# Patient Record
Sex: FEMALE | Race: OTHER
[De-identification: names, ages, dates, MRNs, and addresses within clinical notes are randomized per-mention and may not be internally consistent; named-entity substitution may affect disease eponyms.]

---

## 2019-04-11 ENCOUNTER — HOSPITAL ENCOUNTER (INPATIENT)
Dept: HOSPITAL 72 - EMR | Age: 84
LOS: 5 days | Discharge: NURSING, REHAB OR LONG-TERM CARE | DRG: 720 | End: 2019-04-16
Payer: MEDICAID

## 2019-04-11 VITALS — DIASTOLIC BLOOD PRESSURE: 70 MMHG | SYSTOLIC BLOOD PRESSURE: 123 MMHG

## 2019-04-11 VITALS — DIASTOLIC BLOOD PRESSURE: 72 MMHG | SYSTOLIC BLOOD PRESSURE: 136 MMHG

## 2019-04-11 VITALS — HEIGHT: 60 IN | WEIGHT: 172.44 LBS | BODY MASS INDEX: 33.86 KG/M2

## 2019-04-11 DIAGNOSIS — I69.354: ICD-10-CM

## 2019-04-11 DIAGNOSIS — E87.5: ICD-10-CM

## 2019-04-11 DIAGNOSIS — Z95.828: ICD-10-CM

## 2019-04-11 DIAGNOSIS — N18.9: ICD-10-CM

## 2019-04-11 DIAGNOSIS — R13.10: ICD-10-CM

## 2019-04-11 DIAGNOSIS — D69.6: ICD-10-CM

## 2019-04-11 DIAGNOSIS — E87.8: ICD-10-CM

## 2019-04-11 DIAGNOSIS — E11.22: ICD-10-CM

## 2019-04-11 DIAGNOSIS — Z43.1: ICD-10-CM

## 2019-04-11 DIAGNOSIS — E87.6: ICD-10-CM

## 2019-04-11 DIAGNOSIS — I25.10: ICD-10-CM

## 2019-04-11 DIAGNOSIS — I51.3: ICD-10-CM

## 2019-04-11 DIAGNOSIS — J18.9: ICD-10-CM

## 2019-04-11 DIAGNOSIS — E11.65: ICD-10-CM

## 2019-04-11 DIAGNOSIS — E86.0: ICD-10-CM

## 2019-04-11 DIAGNOSIS — E66.9: ICD-10-CM

## 2019-04-11 DIAGNOSIS — N39.0: ICD-10-CM

## 2019-04-11 DIAGNOSIS — A41.9: Primary | ICD-10-CM

## 2019-04-11 DIAGNOSIS — N17.9: ICD-10-CM

## 2019-04-11 DIAGNOSIS — B18.2: ICD-10-CM

## 2019-04-11 DIAGNOSIS — F02.80: ICD-10-CM

## 2019-04-11 DIAGNOSIS — I82.511: ICD-10-CM

## 2019-04-11 DIAGNOSIS — I13.10: ICD-10-CM

## 2019-04-11 DIAGNOSIS — E44.0: ICD-10-CM

## 2019-04-11 DIAGNOSIS — G93.40: ICD-10-CM

## 2019-04-11 DIAGNOSIS — E87.0: ICD-10-CM

## 2019-04-11 DIAGNOSIS — G30.9: ICD-10-CM

## 2019-04-11 LAB
ADD MANUAL DIFF: YES
ALBUMIN SERPL-MCNC: 2.9 G/DL (ref 3.4–5)
ALBUMIN/GLOB SERPL: 0.5 {RATIO} (ref 1–2.7)
ALP SERPL-CCNC: 74 U/L (ref 46–116)
ALT SERPL-CCNC: 51 U/L (ref 12–78)
ANION GAP SERPL CALC-SCNC: 12 MMOL/L (ref 5–15)
APPEARANCE UR: CLEAR
APTT PPP: YELLOW S
AST SERPL-CCNC: 42 U/L (ref 15–37)
BILIRUB SERPL-MCNC: 0.7 MG/DL (ref 0.2–1)
BUN SERPL-MCNC: 45 MG/DL (ref 7–18)
CALCIUM SERPL-MCNC: 9.2 MG/DL (ref 8.5–10.1)
CHLORIDE SERPL-SCNC: 120 MMOL/L (ref 98–107)
CK MB SERPL-MCNC: 0.6 NG/ML (ref 0–3.6)
CK SERPL-CCNC: 44 U/L (ref 26–308)
CO2 SERPL-SCNC: 28 MMOL/L (ref 21–32)
CREAT SERPL-MCNC: 1.1 MG/DL (ref 0.55–1.3)
ERYTHROCYTE [DISTWIDTH] IN BLOOD BY AUTOMATED COUNT: 18.2 % (ref 11.6–14.8)
GLOBULIN SER-MCNC: 5.3 G/DL
GLUCOSE UR STRIP-MCNC: NEGATIVE MG/DL
HCT VFR BLD CALC: 43.2 % (ref 37–47)
HGB BLD-MCNC: 13.4 G/DL (ref 12–16)
KETONES UR QL STRIP: (no result)
LEUKOCYTE ESTERASE UR QL STRIP: (no result)
MCV RBC AUTO: 91 FL (ref 80–99)
NITRITE UR QL STRIP: NEGATIVE
PH UR STRIP: 5 [PH] (ref 4.5–8)
PHOSPHATE SERPL-MCNC: 2.9 MG/DL (ref 2.5–4.9)
PLATELET # BLD: 118 K/UL (ref 150–450)
POTASSIUM SERPL-SCNC: 3.3 MMOL/L (ref 3.5–5.1)
PROT UR QL STRIP: (no result)
RBC # BLD AUTO: 4.77 M/UL (ref 4.2–5.4)
SODIUM SERPL-SCNC: 161 MMOL/L (ref 136–145)
SP GR UR STRIP: 1.02 (ref 1–1.03)
UROBILINOGEN UR-MCNC: 4 MG/DL (ref 0–1)
WBC # BLD AUTO: 22.5 K/UL (ref 4.8–10.8)

## 2019-04-11 PROCEDURE — 85007 BL SMEAR W/DIFF WBC COUNT: CPT

## 2019-04-11 PROCEDURE — 84484 ASSAY OF TROPONIN QUANT: CPT

## 2019-04-11 PROCEDURE — 87340 HEPATITIS B SURFACE AG IA: CPT

## 2019-04-11 PROCEDURE — 83690 ASSAY OF LIPASE: CPT

## 2019-04-11 PROCEDURE — 71045 X-RAY EXAM CHEST 1 VIEW: CPT

## 2019-04-11 PROCEDURE — 82803 BLOOD GASES ANY COMBINATION: CPT

## 2019-04-11 PROCEDURE — 93971 EXTREMITY STUDY: CPT

## 2019-04-11 PROCEDURE — 85730 THROMBOPLASTIN TIME PARTIAL: CPT

## 2019-04-11 PROCEDURE — 85025 COMPLETE CBC W/AUTO DIFF WBC: CPT

## 2019-04-11 PROCEDURE — 36600 WITHDRAWAL OF ARTERIAL BLOOD: CPT

## 2019-04-11 PROCEDURE — 76700 US EXAM ABDOM COMPLETE: CPT

## 2019-04-11 PROCEDURE — 96375 TX/PRO/DX INJ NEW DRUG ADDON: CPT

## 2019-04-11 PROCEDURE — 96367 TX/PROPH/DG ADDL SEQ IV INF: CPT

## 2019-04-11 PROCEDURE — 80048 BASIC METABOLIC PNL TOTAL CA: CPT

## 2019-04-11 PROCEDURE — 84100 ASSAY OF PHOSPHORUS: CPT

## 2019-04-11 PROCEDURE — 96365 THER/PROPH/DIAG IV INF INIT: CPT

## 2019-04-11 PROCEDURE — 83880 ASSAY OF NATRIURETIC PEPTIDE: CPT

## 2019-04-11 PROCEDURE — 83605 ASSAY OF LACTIC ACID: CPT

## 2019-04-11 PROCEDURE — 82550 ASSAY OF CK (CPK): CPT

## 2019-04-11 PROCEDURE — 83735 ASSAY OF MAGNESIUM: CPT

## 2019-04-11 PROCEDURE — 81003 URINALYSIS AUTO W/O SCOPE: CPT

## 2019-04-11 PROCEDURE — 86709 HEPATITIS A IGM ANTIBODY: CPT

## 2019-04-11 PROCEDURE — 80053 COMPREHEN METABOLIC PANEL: CPT

## 2019-04-11 PROCEDURE — 96368 THER/DIAG CONCURRENT INF: CPT

## 2019-04-11 PROCEDURE — 36415 COLL VENOUS BLD VENIPUNCTURE: CPT

## 2019-04-11 PROCEDURE — 99285 EMERGENCY DEPT VISIT HI MDM: CPT

## 2019-04-11 PROCEDURE — 87040 BLOOD CULTURE FOR BACTERIA: CPT

## 2019-04-11 PROCEDURE — 86710 INFLUENZA VIRUS ANTIBODY: CPT

## 2019-04-11 PROCEDURE — 86803 HEPATITIS C AB TEST: CPT

## 2019-04-11 PROCEDURE — 87081 CULTURE SCREEN ONLY: CPT

## 2019-04-11 PROCEDURE — 82553 CREATINE MB FRACTION: CPT

## 2019-04-11 PROCEDURE — 87086 URINE CULTURE/COLONY COUNT: CPT

## 2019-04-11 PROCEDURE — 86705 HEP B CORE ANTIBODY IGM: CPT

## 2019-04-11 PROCEDURE — 93005 ELECTROCARDIOGRAM TRACING: CPT

## 2019-04-11 PROCEDURE — 96361 HYDRATE IV INFUSION ADD-ON: CPT

## 2019-04-11 PROCEDURE — 86703 HIV-1/HIV-2 1 RESULT ANTBDY: CPT

## 2019-04-11 RX ADMIN — DEXTROSE AND POTASSIUM CHLORIDE SCH MLS/HR: 5; .15 SOLUTION INTRAVENOUS at 18:57

## 2019-04-11 RX ADMIN — DONEPEZIL HYDROCHLORIDE SCH MG: 10 TABLET, FILM COATED ORAL at 22:06

## 2019-04-11 RX ADMIN — ATENOLOL SCH MG: 25 TABLET ORAL at 19:00

## 2019-04-11 NOTE — DIAGNOSTIC IMAGING REPORT
Indication: Shortness of breath

 

Technique: One view of the chest

 

Comparison: 3/25/2019

 

Findings: Inspiration is suboptimal with crowding of the bronchovascular markings.

Lungs and pleural spaces are otherwise clear. The heart size is normal. The aorta is

tortuous and calcified.

 

Impression: No acute process

## 2019-04-11 NOTE — NUR
NURSE NOTES:

Pt's aPTT @ 138, per protocol. Heparin to be stopped for 1 hour and continued at 2345 @ a 
rate of 15 u/kg/hr. Next aPTT set up for 0545. Will continue to monitor

## 2019-04-11 NOTE — DIAGNOSTIC IMAGING REPORT
Indication: Right leg pain

 

Technique: Grayscale and duplex images of the right lower extremity veins

 

Comparison: 3/13/2019

 

Findings: Intraluminal thrombus is seen in the common femoral vein, with resultant

decrease Doppler flow and noncompressibility. This is equivocally more extensive than

on the prior study. Intraluminal thrombus is also seen within the right femoral and

popliteal veins. This results in noncompressibility and absent Doppler signal as

well. The calf veins appear to be patent.

 

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 This is also described

on recent study of 3/13/2019, and it is unclear whether current findings represent

residual thrombosis or whether there is increased thrombus relative to the prior

study

 

Findings discussed by phone with Dr. Macias in the emergency room at the time of

interpretation

## 2019-04-11 NOTE — NUR
NURSE NOTES:

pt on 3lnc. rhonchi through out with weak cough. abdomen large, non tender. temp oral 996.4 
bp 142/62 hr 89, rr 22 o2sat 96%. no c/o pain per daughter at bedside. pt in adult diaper, 
perianal and perineal redness. purewick applied. 1 bm, brown large. rt leg, edema and hot to 
touch. heparin drip  at 18u/kg/hr. fall precautions in place. bed alarm on. will continue to 
monitor pt.

## 2019-04-11 NOTE — NUR
ED Nurse Note:pt. 

s skin is dry and intact with perianal redness, right leg is inlarged and 
edemous, ND is aware

## 2019-04-11 NOTE — HISTORY AND PHYSICAL REPORT
DATE OF ADMISSION:  04/11/2019

PURPOSE FOR ADMISSION:

1. Sepsis.

2. Acute encephalopathy.

3. Hypernatremia.



HISTORY OF PRESENT ILLNESS:  The patient is an 87-year-old female sent for

further evaluation and care from her facility due to increased fever and

altered mental status.  Noted to have a white count of over 22,000.  The

patient had a serum sodium of 161 with a BUN of 45 along with possible

urinary tract infection.  The patient recently had been at [facility] regarding Intensive Care Unit management along with intubation and

mechanical ventilation.  During this time, the patient had a G-tube placed

along with discontinuation of her Coumadin due to high risk of bleeding.

She was then stabilized and discharged to a skilled facility.  She now

returns with an elevated troponin and elevated serum sodium, febrile, and

a white count of 22,000.



PAST MEDICAL HISTORY:

1. Dementia.

2. Hypertension.

3. Failure to thrive.

4. Respiratory failure.



SURGICAL HISTORY:  G-tube placement.



ALLERGIES:  No known drug allergies.



FAMILY HISTORY:  Positive for hypertension.



REVIEW OF SYSTEMS:  Cannot obtain as this patient is not answering

questions coherently.



LABORATORY DATA:  Laboratories dated 04/11/2019, sodium 161, potassium 3.3,

BUN 45, and creatinine 1.1.  WBC 22.5, hemoglobin 13.4, and platelet count

118,000.



PHYSICAL EXAMINATION:

VITAL SIGNS:  Blood pressure 136/72, respiratory rate 30, pulse 90,

temperature 98.6, and oxygen saturation 96%.

GENERAL:  The patient is somnolent, arousable, and nonverbal.

HEENT:  Extraocular muscles intact.  Mouth, opened, mouth breathing,

non-communicative.

CARDIOVASCULAR:  S1, S2.  No murmurs, rubs, or gallops.

PULMONARY:  Upper airway rhonchi.  Fair air movement in all lung fields.

ABDOMEN:  Nondistended and nontender.

EXTREMITY:  Right extremity edema from ankle to thigh.



ASSESSMENT AND PLAN:

1. Acute DVT of the right lower extremity.  The patient does have an IVC

filter in place.  Coumadin had been discontinued 3 weeks ago.  Under _____

Hematology/Oncology were consulted for further evaluation and management,

and whether or not to initiate any anticoagulation.  The patient does have

a history of a brain aneurysm.

2. Sepsis with elevated white count.  Possible due to underlying urinary

tract infection.  The patient being pancultured and broad-spectrum

antibiotics have been initiated along with Infectious Disease to manage.

3. Elevated troponin in the setting of sepsis.  The patient to be

continued managed by her cardiologist Dr. Francis.

4. Hypernatremia due to free water deficit.  The patient will be started

on free water.

5. Nutritional status will be addressed with tube feedings.









  ______________________________________________

  Sanya Kwan MD





DR:  SUNITA/YESTELLA

D:  04/11/2019 16:45

T:  04/11/2019 22:03

JOB#:  [id number]

CC:

## 2019-04-11 NOTE — NUR
NURSE NOTES:

Report received from KRUPA Marin. Pt A/Ox1. Cardiac monitor showing ST. Pt currently on 2L NC. 
Shows no signs of acute respiratory or cardiac distress. Pt has a GT in place. Glucerna 1.2 
to be started @ 15 ml/hr. Purewick present and suctioning well. Perinial redness and 
irritation found. Pt has a LW24g with heparin running @ 18 u/kg/hr and a RW22g with [id number] 
running @ 50 ml/hr. Timed PTT set for 2200. Bed in lowest position, bed alarms placed, call 
light within reach. Will continue to monitor and with patients plan of care.

## 2019-04-11 NOTE — NUR
NURSE NOTES:

Received patient from Susan GENTILE. Patient is asleep receiving Oxygen via Nasal Cannula at 
2L/min. Gtube is patent and intact. Urine is drained via purewick suctioning well. IV site 
is left wrist 24 gauge intact and asymptomatic receiving Hpearin drip at 18units/kg at 
22.8cc/hr. Right wrist 22g intact and asymptomatic receiving D5W with 20mEq KCl at 50cc/hr. 
Bed is locked, placed in lowest position, side rails up x3, call light within reach. Will 
continue to monitor.

## 2019-04-11 NOTE — NUR
ED Nurse Note:pt. was BIBA from SNF with c/o fever 100.4 rectal and sepsis, 
blood cultures and flu swab, urine to labs

## 2019-04-12 VITALS — DIASTOLIC BLOOD PRESSURE: 55 MMHG | SYSTOLIC BLOOD PRESSURE: 121 MMHG

## 2019-04-12 VITALS — DIASTOLIC BLOOD PRESSURE: 52 MMHG | SYSTOLIC BLOOD PRESSURE: 118 MMHG

## 2019-04-12 VITALS — SYSTOLIC BLOOD PRESSURE: 98 MMHG | DIASTOLIC BLOOD PRESSURE: 61 MMHG

## 2019-04-12 VITALS — DIASTOLIC BLOOD PRESSURE: 62 MMHG | SYSTOLIC BLOOD PRESSURE: 130 MMHG

## 2019-04-12 VITALS — SYSTOLIC BLOOD PRESSURE: 130 MMHG | DIASTOLIC BLOOD PRESSURE: 62 MMHG

## 2019-04-12 VITALS — DIASTOLIC BLOOD PRESSURE: 68 MMHG | SYSTOLIC BLOOD PRESSURE: 139 MMHG

## 2019-04-12 LAB
ADD MANUAL DIFF: YES
ANION GAP SERPL CALC-SCNC: 7 MMOL/L (ref 5–15)
BUN SERPL-MCNC: 36 MG/DL (ref 7–18)
CALCIUM SERPL-MCNC: 8.8 MG/DL (ref 8.5–10.1)
CHLORIDE SERPL-SCNC: 119 MMOL/L (ref 98–107)
CO2 SERPL-SCNC: 29 MMOL/L (ref 21–32)
CREAT SERPL-MCNC: 0.9 MG/DL (ref 0.55–1.3)
ERYTHROCYTE [DISTWIDTH] IN BLOOD BY AUTOMATED COUNT: 18.3 % (ref 11.6–14.8)
HCT VFR BLD CALC: 44.9 % (ref 37–47)
HGB BLD-MCNC: 13.7 G/DL (ref 12–16)
MCV RBC AUTO: 91 FL (ref 80–99)
PLATELET # BLD: 111 K/UL (ref 150–450)
POTASSIUM SERPL-SCNC: 4.4 MMOL/L (ref 3.5–5.1)
RBC # BLD AUTO: 4.95 M/UL (ref 4.2–5.4)
SODIUM SERPL-SCNC: 155 MMOL/L (ref 136–145)
WBC # BLD AUTO: 16 K/UL (ref 4.8–10.8)

## 2019-04-12 RX ADMIN — DONEPEZIL HYDROCHLORIDE SCH MG: 10 TABLET, FILM COATED ORAL at 20:46

## 2019-04-12 RX ADMIN — DEXTROSE AND POTASSIUM CHLORIDE SCH MLS/HR: 5; .15 SOLUTION INTRAVENOUS at 14:54

## 2019-04-12 RX ADMIN — ATENOLOL SCH MG: 25 TABLET ORAL at 18:00

## 2019-04-12 RX ADMIN — ATENOLOL SCH MG: 25 TABLET ORAL at 08:42

## 2019-04-12 RX ADMIN — HEPARIN SODIUM SCH MLS/HR: 5000 INJECTION, SOLUTION INTRAVENOUS at 06:48

## 2019-04-12 RX ADMIN — HEPARIN SODIUM SCH MLS/HR: 5000 INJECTION, SOLUTION INTRAVENOUS at 18:02

## 2019-04-12 NOTE — NUR
88 Y/[facility] FEM[facility]



CC:FEVER



SI:ACUTE DVT . HCAP . SEPSIS

VS: /72, P 100, T 100.4, RR 36, SpO2 92 on 4.0L O2 NC

WBC 22.5, Na 161. K 3.3, BUN 45

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



IS:PROTONIX 40mg 

HEPARIN 500ml IV

ATENOLOL 25mg GT





***********************ADMITTED TO SDU***********************



*************DCP: RETURN TO [facility]**************

## 2019-04-12 NOTE — NUR
NURSE NOTES:

Spoke to Dr Cotton about potassium level (4.4) and Sodium (155) to clarify IVF currently 
running (D5W w/ 20 mEq KCL). Dr Cotton instructed me to continue with order.

## 2019-04-12 NOTE — NUR
Received pt from KRUPA Garza in stable condition with no cardiopulmonary distress noted. Pt is 
awake in bed, AAOx1 on 2L O2 via NC. Pt has a GT running Glucerna 1.2 at 25 cc/hr. No 
residuals noted and pt flushed with 30cc H20. Purewick noted draining yellow urine. No BM 
noted at this time. Pt pulled out R wrist IV where Heparin was infusing. R hand 20g IV 
started at this time and Heparin continued. Pt also has another R hand 22g IV patent and 
flushing. No visible bleeding noted at this time- minor bruising noted on left hand. Skin 
alterations noted. 2+ pitting edema noted on RLE. Bed is in low with alarm on, side rails up 
x 4, call light within reach. Will continue to monitor pt.

## 2019-04-12 NOTE — NUR
HAND-OFF: 

Report given to KRUPA Kee. Pt is resting on the bed and no sign of acute distress noted. US 
abdome nwas taken and resumed G-tube feeding. On running with heparin drip 17U/kg/hr and 
collected PTT lab and result is pending now.

## 2019-04-12 NOTE — NUR
NURSE NOTES:

Patient turned and repositioned.L hand peripheral line D/C and new line 22G placed on right 
hand.Tolerated well.

## 2019-04-12 NOTE — NUR
NURSE NOTES:

Received patient from KRUPA Garza. Patient VS stable at this time. Patient being placed on 
P200 mattress at this time. Patient able to be aroused by name but does not answer 
questions. Patient does not speak except when she feels pain. Patient sleeping at this time. 
Patient showing sinus rhythm on the monitor. Patient on 2L NC at this time. Patient has G 
tube that is patent and asymptomatic at this time. Patient has a purewick that is in place 
and patent. Patient has right wrist 22G and right hand 22G that is running heparin drip at 
17units per hour at this time. Patient has acute DVT of the right leg. Patient running D5W 
with 20mEq at 50mL/hr at this time. Next PTT at 2000. Will follow up. Patient bed in low 
position with bed alarm on and call light in reach at this time. Patient has small sacral 
stage 2 pressure ulcer. Patient has perineal redness.

## 2019-04-12 NOTE — NUR
NURSE NOTES:



Received PTT result 73, pharmacy called to keep Heparin at same rate 17 units/kg/hr.

## 2019-04-12 NOTE — PROGRESS NOTE
DATE:  04/12/2019

CARDIOLOGY PROGRESS NOTE



SUBJECTIVE:  The patient is more alert and interactive and in no

distress.



OBJECTIVE:

VITAL SIGNS:  Blood pressure 139/68, pulse 69, and respirations 20.

Monitored rhythm, sinus.

LUNGS:  Bilateral breath sounds.  No wheezing.

HEART:  Regular rhythm and rate.  Normal S1, S2 with a fourth heart

sound.

ABDOMEN:  Soft.

EXTREMITIES:  A 1+ edema.

NEUROLOGIC:  Left paresis.



LABORATORY DATA:  White count 16 and hemoglobin 13.7.  Sodium 155,

potassium 4.4, bicarbonate 29, BUN 36, and creatinine 0.9.  Troponin is

pending.



IMPRESSION:

1. Severe dehydration.

2. Hypernatremia.

3. Hyperchloremia.

4. Prerenal azotemia.

5. Type 2 diabetes mellitus with hyperglycemia.

6. Acute myocardial ischemia and possibly non-ST-elevation infarction.

7. Moderate protein-calorie malnutrition.

8. Sepsis.

9. DVT with history of IVC filter.

10. Urinary tract infection.



PLAN:

1. Hypotonic IV fluids.

2. Monitor and correct electrolytes as needed.

3. Antianginal therapy.

4. Follow up troponin level.

5. Based on historical data, bleeding risk is probably managed without

anticoagulation.









  ______________________________________________

  Misbah Francis M.D.





DR:  WILLIE

D:  04/12/2019 14:08

T:  04/12/2019 20:35

JOB#:  [id number]

CC:

## 2019-04-12 NOTE — CARDIOLOGY REPORT
--------------- APPROVED REPORT --------------





EKG Measurement

Heart [id number]

GA [id number]

[id number]

[id number]

[id number]





Sinus tachycardia with premature atrial complexes

Otherwise normal ECG

## 2019-04-12 NOTE — NUR
HAND-OFF: 

Report given to KRUPA Bustos. Patient VS stable at this time with no sign of acute distress. 
Patient has PTT timed draw at 2000. Endorsed to follow up. Patient has an order for 
abdominal ultrasound. Patient NPO for ultrasound. Endorsed to follow up.

## 2019-04-12 NOTE — NUR
NURSE NOTES:



Received report from HERB Devlin RN. Patient is asleep in bed, A/O x1, arousable to shaking. 
Sinus rayo on cardiac monitor. No s/s of acute distress noted. Saturating well on 2L O2 via 
nasal cannula. Patient was restarted on Glucerna 1.2 @ 25 cc/hr at 2000 via GT, tolerating 
well -- goal of 40 cc/hr. Purewick catheter in place and suctioning well. Right hand 22g IV, 
intact and patent, running heparin drip at 17 units/kg/hr. Right wrist 22g IV, intact and 
patent, running D5W with 20meq KCl at 50 cc/hr. Bed locked in lowest position with side 
rails up x3. Call light left within reach. Will continue to monitor.

## 2019-04-12 NOTE — NEPHROLOGY PROGRESS NOTE
Assessment/Plan


Assessment/Plan


A/P





1) Hypernatremia- improved on D5W. Na down to 155


     - continue D5W


2) Hypokalemia- corrected


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


     - has IVC filter


     - Heme to address


4) Sepsis- urosepsis


     - Abx per ID to manage going forward





Subjective


Date patient seen:  Apr 12, 2019


Time patient seen:  07:21


ROS Limited/Unobtainable:  Yes


Allergies:  


Coded Allergies:  


     No Known Allergies (Unverified , 10/4/16)


Subjective


Patient more awake today.





Objective





Last 24 Hour Vital Signs








  Date Time  Temp Pulse Resp B/P (MAP) Pulse Ox O2 Delivery O2 Flow Rate FiO2


 


4/12/19 04:00      Nasal Cannula 2.0 


 


4/12/19 04:00 98.7 69 20 139/68 (91) 98   


 


4/12/19 04:00  60      


 


4/12/19 04:00       2.0 


 


4/12/19 00:00 97.7 70 24 130/62 (84) 97   


 


4/12/19 00:00  62      


 


4/12/19 00:00      Nasal Cannula 2.0 


 


4/11/19 20:00  78      


 


4/11/19 20:00       2.0 


 


4/11/19 20:00      Nasal Cannula 2.0 


 


4/11/19 19:00  89  142/62    


 


4/11/19 17:32      Nasal Cannula 3.0 


 


4/11/19 17:27 98.6 90 30 136/72 96 Nasal Cannula 4.0 


 


4/11/19 16:32 98.6 90 30 136/72 96 Nasal Cannula 4.0 


 


4/11/19 15:15 100.4 100 36 123/70 92 Nasal Cannula 4.0 


 


4/11/19 15:15  100 36   Nasal Cannula 4.0 


 


4/11/19 13:58  70 36 123/70 92 Nasal Cannula 4.0 

















Intake and Output  


 


 4/11/19 4/12/19





 18:59 06:59


 


Intake Total  185.722 ml


 


Output Total  100 ml


 


Balance  85.722 ml


 


  


 


Intake IV Total  45.722 ml


 


Tube Feeding  140 ml


 


Output Urine Total  100 ml


 


# Voids  3


 


# Bowel Movements  4








Laboratory Tests


4/11/19 14:20: 


White Blood Count 22.5*H, Red Blood Count 4.77, Hemoglobin 13.4, Hematocrit 43.2

, Mean Corpuscular Volume 91, Mean Corpuscular Hemoglobin 28.0, Mean 

Corpuscular Hemoglobin Concent 30.9L, Red Cell Distribution Width 18.2H, 

Platelet Count 118L, Mean Platelet Volume 10.9H, Neutrophils (%) (Auto) , 

Lymphocytes (%) (Auto) , Monocytes (%) (Auto) , Eosinophils (%) (Auto) , 

Basophils (%) (Auto) , Differential Total Cells Counted 100, Neutrophils % (

Manual) 87H, Lymphocytes % (Manual) 6L, Monocytes % (Manual) 2, Eosinophils % (

Manual) 0, Basophils % (Manual) 0, Band Neutrophils 5, Platelet Estimate 

DecreasedL, Platelet Morphology Normal, Polychromasia 1+, Anisocytosis 1+, 

Activated Partial Thromboplast Time 26, Sodium Level 161*H, Potassium Level 3.3L

, Chloride Level 120H, Carbon Dioxide Level 28, Anion Gap 12, Blood Urea 

Nitrogen 45H, Creatinine 1.1, Estimat Glomerular Filtration Rate , Glucose 

Level 210H, Lactic Acid Level 1.80, Calcium Level 9.2, Phosphorus Level 2.9, 

Magnesium Level 2.6H, Total Bilirubin 0.7, Aspartate Amino Transf (AST/SGOT) 42H

, Alanine Aminotransferase (ALT/SGPT) 51, Alkaline Phosphatase 74, Total 

Creatine Kinase 44, Creatine Kinase MB 0.6, Creatine Kinase MB Relative Index 

1.3, Troponin I 0.076H, Total Protein 8.2, Albumin 2.9L, Globulin 5.3, Albumin/

Globulin Ratio 0.5L, Lipase 121


4/11/19 14:40: 


Urine Color Yellow, Urine Appearance Clear, Urine pH 5, Urine Specific Gravity 

1.025, Urine Protein 2+H, Urine Glucose (UA) Negative, Urine Ketones 1+H, Urine 

Blood Negative, Urine Nitrite Negative, Urine Bilirubin Negative, Urine 

Urobilinogen 4H, Urine Leukocyte Esterase 1+H, Urine RBC 0-2, Urine WBC 2-4, 

Urine Squamous Epithelial Cells Few, Urine Amorphous Sediment FewH, Urine 

Bacteria ModerateH


4/11/19 16:59: 


Arterial Blood pH 7.451H, Arterial Blood Partial Pressure CO2 37.1, Arterial 

Blood Partial Pressure O2 74.4L, Arterial Blood HCO3 25.3, Arterial Blood 

Oxygen Saturation 94.6L, Arterial Blood Base Excess 1.5, Joaquin Test N/a


4/11/19 22:00: Activated Partial Thromboplast Time 138H


4/12/19 05:53: 


White Blood Count 16.0H, Red Blood Count 4.95, Hemoglobin 13.7, Hematocrit 44.9

, Mean Corpuscular Volume 91, Mean Corpuscular Hemoglobin 27.6, Mean 

Corpuscular Hemoglobin Concent 30.4L, Red Cell Distribution Width 18.3H, 

Platelet Count 111L, Mean Platelet Volume 10.9H, Neutrophils (%) (Auto) , 

Lymphocytes (%) (Auto) , Monocytes (%) (Auto) , Eosinophils (%) (Auto) , 

Basophils (%) (Auto) , Neutrophils % (Manual) [Pending], Lymphocytes % (Manual) 

[Pending], Platelet Estimate [Pending], Platelet Morphology [Pending], 

Activated Partial Thromboplast Time 58H, Sodium Level 155H, Potassium Level 4.4

, Chloride Level 119H, Carbon Dioxide Level 29, Anion Gap 7, Blood Urea 

Nitrogen 36H, Creatinine 0.9, Estimat Glomerular Filtration Rate , Glucose 

Level 242H, Calcium Level 8.8


Height (Feet):  5


Height (Inches):  5.00


Weight (Pounds):  169


General Appearance:  no apparent distress, agitated


EENT:  normal ENT inspection


Neck:  normal alignment, supple


Cardiovascular:  normal rate, regular rhythm


Respiratory/Chest:  rhonchi - bilaterally


Abdomen:  non tender, soft


Edema:  no edema noted Arm (L), no edema noted Arm (R), no edema noted Leg (L), 

no edema noted Leg (R), no edema noted Pedal (L), no edema noted Pedal (R), no 

edema noted Generalized











Sanya Kwan MD Apr 12, 2019 07:23

## 2019-04-12 NOTE — CONSULTATION
History of Present Illness


General


Chief Complaint:  Fever





Present Illness


Allergies:  


Coded Allergies:  


     No Known Allergies (Unverified , 10/4/16)





Medication History


Scheduled


Atenolol (Tenormin), 100 MG ORAL DAILY, (Reported)


Atenolol* (Tenormin*), 25 MG ORAL BID, (Reported)


Azithromycin* (Zithromax*), 250 MG ORAL DAILY


Cephalexin* (Cephalexin*), 500 MG ORAL BID, (Reported)


Cephalexin* (Keflex*), 500 MG ORAL EVERY 6 HOURS


Docusate Sodium* (Colace*), 100 MG ORAL DAILY, (Reported)


Donepezil Hcl (Aricept), 10 MG ORAL DAILY, (Reported)


Famotidine (Famotidine), 20 MG ORAL BID


Furosemide* (Lasix*), 20 MG ORAL DAILY


Lorazepam* (Lorazepam*), 2 MG ORAL BEDTIME, (Reported)


Losartan Potassium* (Losartan Potassium*), 50 MG ORAL Q12HR, (Reported)


Nitrofurantoin Monohyd/M-Cryst* (Macrobid 100 Mg*), 100 MG ORAL EVERY 12 HOURS


Omeprazole (Omeprazole), 20 MG ORAL DAILY, (Reported)


Potassium Chloride (Potassium Chloride), 20 MEQ PO BID, (Reported)


Quetiapine Fumarate (Quetiapine Fumarate), 100 MG ORAL DAILY, (Reported)


Quetiapine Fumarate* (Seroquel*), 50 MG ORAL QHS, (Reported)


Quetiapine Fumarate* (Seroquel*), 25 MG ORAL TWICE A DAY, (Reported)


Risperidone* (Risperdal*), 0.25 MG ORAL DAILY, (Reported)


Risperidone* (Risperdal*), 0.5 MG ORAL QHS, (Reported)


Spironolactone* (Aldactone*), 25 MG ORAL DAILY


Warfarin Sod* (Warfarin Sod*), 3 MG ORAL DAILY


Warfarin Sod* (Warfarin Sod*), 3 MG ORAL DAILY, (Reported)





Scheduled PRN


Acetaminophen* (Tylenol Extra Strength*), 650 MG ORAL Q4HR PRN for Mild Pain (

Pain Scale 1-3), (Reported)


Albuterol Sulfate* (Albuterol Sulfate Hhn*), 3 ML INH Q4H PRN for Shortness of 

Breath, (Reported)


Clonidine Hcl (Clonidine Hcl), 0.1 MG PO PRN PRN for For High Blood Pressure, (

Reported)


Diphenhydramine HCl (Benadryl), 25 MG PO PRN PRN for Itching/Pruritis, (Reported

)


Hydrocodone Bit/Acetaminophen * (Norco *), 1 TAB ORAL Q8HR PRN for 

Pain Scale (6-10), (Reported)


Pantoprazole* (Protonix*), 40 MG ORAL Q12HR PRN for Abdominal cramps, (Reported)





Miscellaneous Medications


Risperidone (Risperdal), 1 MG PO, (Reported)





Patient History


Healthcare decision maker


N


Resuscitation status


Full Code


Advanced Directive on File








Physical Exam





Last 24 Hour Vital Signs








  Date Time  Temp Pulse Resp B/P (MAP) Pulse Ox O2 Delivery O2 Flow Rate FiO2


 


4/12/19 12:00       2.0 


 


4/12/19 12:00 96.6 61 20 121/55 (77) 99   


 


4/12/19 12:00  71      


 


4/12/19 08:42  70  130/62    


 


4/12/19 08:00 97.0 70 22 130/62 (84) 96   


 


4/12/19 08:00  65      


 


4/12/19 08:00       2.0 


 


4/12/19 08:00      Nasal Cannula 2.0 


 


4/12/19 04:00      Nasal Cannula 2.0 


 


4/12/19 04:00 98.7 69 20 139/68 (91) 98   


 


4/12/19 04:00  60      


 


4/12/19 04:00       2.0 


 


4/12/19 00:00 97.7 70 24 130/62 (84) 97   


 


4/12/19 00:00  62      


 


4/12/19 00:00      Nasal Cannula 2.0 


 


4/11/19 20:00  78      


 


4/11/19 20:00       2.0 


 


4/11/19 20:00      Nasal Cannula 2.0 


 


4/11/19 19:00  89  142/62    


 


4/11/19 17:32      Nasal Cannula 3.0 


 


4/11/19 17:27 98.6 90 30 136/72 96 Nasal Cannula 4.0 


 


4/11/19 16:32 98.6 90 30 136/72 96 Nasal Cannula 4.0 


 


4/11/19 15:15 100.4 100 36 123/70 92 Nasal Cannula 4.0 


 


4/11/19 15:15  100 36   Nasal Cannula 4.0 

















Intake and Output  


 


 4/11/19 4/12/19





 19:00 07:00


 


Intake Total  185.722 ml


 


Output Total  100 ml


 


Balance  85.722 ml


 


  


 


Intake IV Total  45.722 ml


 


Tube Feeding  140 ml


 


Output Urine Total  100 ml


 


# Voids  3


 


# Bowel Movements  4











Laboratory Tests








Test


  4/11/19


16:59 4/11/19


22:00 4/12/19


05:53 4/12/19


12:50


 


Arterial Blood pH


  7.451


(7.350-7.450) 


  


  


 


 


Arterial Blood Partial


Pressure CO2 37.1 mmHg


(35.0-45.0) 


  


  


 


 


Arterial Blood Partial


Pressure O2 74.4 mmHg


(75.0-100.0)  L 


  


  


 


 


Arterial Blood HCO3


  25.3 mmol/L


(22.0-26.0) 


  


  


 


 


Arterial Blood Oxygen


Saturation 94.6 %


()  L 


  


  


 


 


Arterial Blood Base Excess 1.5 (-2-2)     


 


Joaquin Test N/a     


 


Activated Partial


Thromboplast Time 


  138 SEC


(23-33)  H 58 SEC (23-33)


H 64 SEC (23-33)


H


 


White Blood Count


  


  


  16.0 K/UL


(4.8-10.8)  H 


 


 


Red Blood Count


  


  


  4.95 M/UL


(4.20-5.40) 


 


 


Hemoglobin


  


  


  13.7 G/DL


(12.0-16.0) 


 


 


Hematocrit


  


  


  44.9 %


(37.0-47.0) 


 


 


Mean Corpuscular Volume   91 FL (80-99)   


 


Mean Corpuscular Hemoglobin


  


  


  27.6 PG


(27.0-31.0) 


 


 


Mean Corpuscular Hemoglobin


Concent 


  


  30.4 G/DL


(32.0-36.0)  L 


 


 


Red Cell Distribution Width


  


  


  18.3 %


(11.6-14.8)  H 


 


 


Platelet Count


  


  


  111 K/UL


(150-450)  L 


 


 


Mean Platelet Volume


  


  


  10.9 FL


(6.5-10.1)  H 


 


 


Neutrophils (%) (Auto)


  


  


  % (45.0-75.0)


  


 


 


Lymphocytes (%) (Auto)


  


  


  % (20.0-45.0)


  


 


 


Monocytes (%) (Auto)    % (1.0-10.0)   


 


Eosinophils (%) (Auto)    % (0.0-3.0)   


 


Basophils (%) (Auto)    % (0.0-2.0)   


 


Differential Total Cells


Counted 


  


  100  


  


 


 


Neutrophils % (Manual)   82 % (45-75)  H 


 


Lymphocytes % (Manual)   9 % (20-45)  L 


 


Monocytes % (Manual)   7 % (1-10)   


 


Eosinophils % (Manual)   2 % (0-3)   


 


Basophils % (Manual)   0 % (0-2)   


 


Band Neutrophils   0 % (0-8)   


 


Platelet Estimate   Decreased  L 


 


Platelet Morphology   Normal   


 


Hypochromasia   1+   


 


Anisocytosis   2+   


 


Sodium Level


  


  


  155 MMOL/L


(136-145)  H 


 


 


Potassium Level


  


  


  4.4 MMOL/L


(3.5-5.1) 


 


 


Chloride Level


  


  


  119 MMOL/L


()  H 


 


 


Carbon Dioxide Level


  


  


  29 MMOL/L


(21-32) 


 


 


Anion Gap


  


  


  7 mmol/L


(5-15) 


 


 


Blood Urea Nitrogen


  


  


  36 mg/dL


(7-18)  H 


 


 


Creatinine


  


  


  0.9 MG/DL


(0.55-1.30) 


 


 


Estimat Glomerular Filtration


Rate 


  


   mL/min (>60)  


  


 


 


Glucose Level


  


  


  242 MG/DL


()  H 


 


 


Calcium Level


  


  


  8.8 MG/DL


(8.5-10.1) 


 








Height (Feet):  5


Height (Inches):  5.00


Weight (Pounds):  169


Medications





Current Medications








 Medications


  (Trade)  Dose


 Ordered  Sig/Toribio


 Route


 PRN Reason  Start Time


 Stop Time Status Last Admin


Dose Admin


 


 Acetaminophen


  (Tylenol)  650 mg  Q4H  PRN


 ORAL


 fever (temp>100.5F)  4/11/19 18:30


 5/11/19 18:29   


 


 


 Atenolol


  (Tenormin)  25 mg  BID


 GT


   4/11/19 18:30


 5/11/19 17:59  4/12/19 08:42


 


 


 Dextrose


  (Dextrose 50%)  25 ml  Q30M  PRN


 IV


 Hypoglycemia  4/11/19 18:30


 5/11/19 16:29   


 


 


 Dextrose


  (Dextrose 50%)  50 ml  Q30M  PRN


 IV


 Hypoglycemia  4/11/19 18:30


 5/11/19 16:29   


 


 


 Dextrose/


 Electrolytes  1,000 ml @ 


 50 mls/hr  Q20H


 IV


   4/11/19 18:30


 5/11/19 17:59  4/11/19 18:57


 


 


 Donepezil HCl


  (Aricept)  10 mg  QHS


 GT


   4/11/19 21:00


 5/11/19 20:59  4/11/19 22:06


 


 


 Heparin Sodium/


 Dextrose  500 ml @ 


 21.591 mls/


 hr  ADJUST PER  PROTOCOL


 IV


   4/12/19 06:45


 5/11/19 23:44  4/12/19 06:48


 


 


 Lorazepam


  (Ativan 2mg/ml


 1ml)  0.5 mg  Q4H  PRN


 IV


 For Anxiety  4/11/19 18:30


 4/18/19 18:29   


 


 


 Pantoprazole


  (Protonix)  40 mg  DAILY


 ORAL


   4/12/19 09:00


 5/12/19 08:59  4/12/19 08:42


 











Assessment/Plan


Assessment/Plan


Hematology Consultation





ALEJANDRO MD: Mohit Miller


DOS: 4/12/19


Source:  Family Member, Medical Record





HPI


Patient is an 88-year-old female who presented after increased fever.  Patient 

was sent in from facility.Patient been noted to have increased cough as well as 

elevated sodium.  Patient recent hospitalization at [facility] had 

previously been intubated.  Patient had prior history of tardive dyskinesia and 

had been noted to have increased difficulty with breathing. Also has a hx of 

dvt and s/p ivc filter, i have seen her in the past,  Right common femoral and 

femoral vein thrombosis. This is also described on recent study of 3/13/2019, 

and it is unclear whether current findings represent residual thrombosis or 

whether there is increased thrombus relative to the prior study was noted on 

duplex.





Allergies:  


     No Known Allergies (Unverified , 10/4/16)





Past Medical History:  see triage record


Last Menstrual Period:  na


Reviewed Nursing Documentation:  PMH: Agreed; PSxH: Agreed





Past Medical History:  No History, Except For


Hx Cardiac Problems:  Yes


Hx Hypertension:  Yes


Hx Cancer:  No


Hx Gastrointestinal Problems:  Yes - gtube, GERD


History Of Psychiatric Problem:  Yes - dementia


Hx Neurological Problems:  Yes - BRAIN ANEURYSM


Hx Cerebrovascular Accident:  Yes - aneurysm


Hx Dementia:  Yes





Review of systems:  limited - by mental status


Vital Signs








  Date Time  Temp Pulse Resp B/P (MAP) Pulse Ox O2 Delivery O2 Flow Rate FiO2


 


4/12/19 13:58  70 36 123/70 92 Nasal Cannula 4.0 








Physical Exam:


Vitals: reviewed


General Appearance:  NAD


HEENT:  normocephalic, atraumatic


Neck:  non-tender, normal alignment


Respiratory/Chest:  normal breath sounds bilaterally


Cardiovascular/Chest:  normal peripheral pulses, normal rate


Abdomen:  normal bowel sounds, soft, nontender


Extremities:  normal range of motion





Labs:


Reviewed





Assessment and Recs:


# Acute DVT (deep venous thrombosis) --  Right common femoral and femoral vein 

thrombosis. This is also described on recent study of 3/13/2019, and it is 

unclear whether current findings represent residual thrombosis or whether there 

is increased thrombus relative to the prior study, based on imaging, will 

consider anticoag


--> heparin gtt has been started


--> okay with NOAC or coumadin as an outpatient at [facility]


--> will begin eliquis 5mg po bid to be started at this time x 3 months total


# Thrombocytopenia - potential causes multifactorial, evaluate liver and viral 

etiologies to begin, also could be related to underlying medications patient 

has received. 


--> Hep panel and HIV ordered 


--> US abd to evaluate for cirrhosis and hsm ordered 


--> Peripheral smear ordered to evaluate for blasts /schistocytes 


--> abx and other meds have been reviewed 


--> ok for ppx if plt >50k w/ either heparin or lovenox 


--> Transfuse if Plt < 20k and fever, or if Plt < 10k without fever 


# Sepsis with HCAP (healthcare-associated pneumonia)


--> on abx at this time


# Congestive heart failure (CHF)


# Hypernatremia


--> fluid management as per renal





The timing of this note does not necessarily reflect the time of the patient 

was seen.





Greatly appreciate consultation!











Daniel Sierra MD Apr 12, 2019 14:56

## 2019-04-12 NOTE — NUR
NURSE NOTES:

Report received from KRUPA Oakley. Asleep when received,easily arousal to verbal and tactile 
stimuli.On N/C at 2LPM.Able to follow command.External catheter in place draining yellowish 
urine with no apparent sediment. GT in place with placement intact running Glucerna 1.2 at 
25cc/hr.No residual at this time.HOB elevated to prevent aspiration.Abdomen soft and non 
distended.Turned and repositioned for skin management.LW/24G and RW/22G peripheral line 
patent with no apparent infiltration running heparin drip at 17u/kg/hr and D5W w/20kcl at 
50cc/hr. Patient kept clean and comfortable.Will continue to monitor.

## 2019-04-12 NOTE — CONSULTATION
DATE OF CONSULTATION:  04/11/2019

CARDIOLOGY CONSULTATION



CONSULTING PHYSICIAN:  Misbah Francis M.D.



REQUESTING PHYSICIAN:  Presley Lomax M.D.



REASON FOR CONSULTATION:  Elevated troponin level.



HISTORY OF PRESENT ILLNESS:  This is an 88-year-old female, who has been

recovering at a skilled nursing facility and was recently hospitalized

with respiratory failure due to pneumonia, was returned to the emergency

room with a white count of 22,000 and severely abnormal laboratory studies

including an elevated troponin level.  I have been asked to assist with

further cardiovascular care.



PAST MEDICAL HISTORY:  Includes respiratory failure, cerebrovascular

disease with left-sided weakness, dysphagia with G-tube, hypercoagulable

state with history of DVT and IVC filter.  Warfarin was discontinued due

to high bleeding risk.  Coronary artery disease, hypertensive heart

disease, and chronic kidney disease.



MEDICATIONS:  Prior to admission, reviewed and reconciled.



ALLERGIES:  None known.



FAMILY HISTORY:  Notable for hypertension.



REVIEW OF SYSTEMS:  Not obtainable from the patient.  A 20 minutes time

spent reviewing prior records and hospital stays with pertinent data

outlined above.



PHYSICAL EXAMINATION:

VITAL SIGNS:  Blood pressure 136/72, pulse 90, and respirations 30.

Afebrile.

HEENT:  Temporal wasting.  Dry mucous membranes.

NECK:  Supple.

LUNGS:  With coarse breath sounds.  No wheezing.

CARDIAC:  Regular rhythm and rate.  Normal S1, S2 with a fourth heart

sound.

ABDOMEN:  Soft and nontender.  G-tube intact.

EXTREMITIES:  With edema on the right lower extremity.



LABORATORY DATA:  White count 22.5, hemoglobin 13.4, and platelet count

118,000.  Sodium 161, potassium 3.3, chloride 120, bicarb 28, BUN 45,

creatinine 1.1, and glucose 210.  Troponin 0.076.  Albumin 2.9.  Lactic

acid 1.8.



IMPRESSION:

1. Acute myocardial ischemia.

2. Acute DVT.

3. History of hypercoagulable state.

4. History of IVC filter.

5. Urinary tract infection.

6. Sepsis.

7. Severe dehydration.

8. Hypernatremia.

9. Hyperchloremia.

10. Hypokalemia.

11. Acute on chronic kidney injury with prerenal azotemia.

12. Diabetes mellitus with hyperglycemia.

13. Moderate protein-calorie malnutrition.

14. Dysphagia with G-tube.



PLAN:

1. Hypotonic IV fluid hydration.

2. Potassium replacement.

3. Anginal therapy.

4. Interim anticoagulation.

5. Observe for bleeding.

6. Continue beta-blocker.

7. The patient is high risk.

8. We will readdress long-term anticoagulation prior to discharge.

9. Antimicrobials per primary care physician.









  ______________________________________________

  Misbah Francis M.D.





DR:  WILLIE

D:  04/12/2019 14:15

T:  04/12/2019 20:40

JOB#:  [id number]

CC:

## 2019-04-12 NOTE — NUR
RD ASSESSMENT & RECOMMENDATIONS

SEE CARE ACTIVITY FOR COMPLETE ASSESSMENT



DAILY ESTIMATED NEEDS:

Needs based on Sepsis, wound/ 71kg 

25-30  kcals/kg 

6901-9430  total kcals

1.25-2  g protein/kg

  g total protein 

25-30  mL/kg

3843-3224  total fluid mLs



NUTRITION DIAGNOSIS:

1) Swallowing difficulty r/t dysphagia as evidenced by PEG dep.

2) Altered nutrition related lab values R/T clinical condition, Sepsis as

evidenced by elev BGs (242 210) elev Na (161* -> 155 trend down), elev wbc

(wbc 22.5* -> 16.0 trend down)





CURRENT TF:Glucerna 1.2 @ 40ml/hr x 24 hrs 





ENTERAL NUTRITION RECOMMENDATIONS:

Glucerna 1.2 @ 65ml/hr x 24 hrs  to provide 1560ml, 1872kcal, 94g prot, 1256ml free water 



- Increase goal rate to 65ml/hr x 24 hrs

- Flush per MD/ HOB over 30 degrees





ADDITIONAL RECOMMENDATIONS:

* Recalibrate bed scale for accurate CBW  

* Per SNF: pt's HT=67", CT=168fcg (4/2/19) 

* Wound care: RHEA BID/ f/up with WC eval 

* A1C for eval of glycemic control- BGs in 200's 

* Rec accucheck w/ SSI- BGs in 200's.

## 2019-04-12 NOTE — NUR
NURSE NOTES:

Received Pt is resting on the bed and awake and confused. Family; DTR stay at bedside. On 
Heparin drip with 17U/kg/hr and no sign of acute bleeding noted. IV site intact and no sign 
of infiltration noted. On O2 2L via nasal cannula and SaO2 97% noted. Dressing is clean and 
dry on wound area. On P200 mattress for wound management. Changed position. On Tele monitor 
with SB; 57's. On NPO d/t US abdomen today. Denied pain at this time. Placed fall 
precaution. Will continue to care plan.

## 2019-04-13 VITALS — SYSTOLIC BLOOD PRESSURE: 108 MMHG | DIASTOLIC BLOOD PRESSURE: 52 MMHG

## 2019-04-13 VITALS — SYSTOLIC BLOOD PRESSURE: 113 MMHG | DIASTOLIC BLOOD PRESSURE: 54 MMHG

## 2019-04-13 VITALS — SYSTOLIC BLOOD PRESSURE: 116 MMHG | DIASTOLIC BLOOD PRESSURE: 51 MMHG

## 2019-04-13 VITALS — DIASTOLIC BLOOD PRESSURE: 67 MMHG | SYSTOLIC BLOOD PRESSURE: 124 MMHG

## 2019-04-13 VITALS — SYSTOLIC BLOOD PRESSURE: 127 MMHG | DIASTOLIC BLOOD PRESSURE: 47 MMHG

## 2019-04-13 VITALS — DIASTOLIC BLOOD PRESSURE: 51 MMHG | SYSTOLIC BLOOD PRESSURE: 114 MMHG

## 2019-04-13 LAB
ADD MANUAL DIFF: YES
ANION GAP SERPL CALC-SCNC: 6 MMOL/L (ref 5–15)
BUN SERPL-MCNC: 32 MG/DL (ref 7–18)
CALCIUM SERPL-MCNC: 9.3 MG/DL (ref 8.5–10.1)
CHLORIDE SERPL-SCNC: 116 MMOL/L (ref 98–107)
CK MB SERPL-MCNC: 1.8 NG/ML (ref 0–3.6)
CK SERPL-CCNC: 71 U/L (ref 26–308)
CO2 SERPL-SCNC: 33 MMOL/L (ref 21–32)
CREAT SERPL-MCNC: 0.9 MG/DL (ref 0.55–1.3)
ERYTHROCYTE [DISTWIDTH] IN BLOOD BY AUTOMATED COUNT: 17.7 % (ref 11.6–14.8)
HCT VFR BLD CALC: 43.1 % (ref 37–47)
HGB BLD-MCNC: 13.3 G/DL (ref 12–16)
MCV RBC AUTO: 90 FL (ref 80–99)
PLATELET # BLD: 85 K/UL (ref 150–450)
POTASSIUM SERPL-SCNC: 3.5 MMOL/L (ref 3.5–5.1)
RBC # BLD AUTO: 4.81 M/UL (ref 4.2–5.4)
SODIUM SERPL-SCNC: 155 MMOL/L (ref 136–145)
WBC # BLD AUTO: 12.1 K/UL (ref 4.8–10.8)

## 2019-04-13 RX ADMIN — ATENOLOL SCH MG: 25 TABLET ORAL at 09:18

## 2019-04-13 RX ADMIN — HEPARIN SODIUM SCH MLS/HR: 5000 INJECTION, SOLUTION INTRAVENOUS at 18:21

## 2019-04-13 RX ADMIN — DEXTROSE AND POTASSIUM CHLORIDE SCH MLS/HR: 5; .15 SOLUTION INTRAVENOUS at 14:00

## 2019-04-13 RX ADMIN — ATENOLOL SCH MG: 25 TABLET ORAL at 21:00

## 2019-04-13 RX ADMIN — HEPARIN SODIUM SCH MLS/HR: 5000 INJECTION, SOLUTION INTRAVENOUS at 15:22

## 2019-04-13 NOTE — NEPHROLOGY PROGRESS NOTE
Assessment/Plan


Assessment/Plan


A/P





1) Hypernatremia- improved on D5W. Will increase rate to 75 ml/hr


     - continue D5W + 20 meq KCL


2) Hypokalemia- corrected. On IVFs


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


     - has IVC filter


     - Heme to manage


4) Sepsis- urosepsis


     - Abx per ID to manage going forward


5) ACS/- medical mgmt per cardiology





Subjective


Date patient seen:  Apr 13, 2019


Time patient seen:  07:49


ROS Limited/Unobtainable:  Yes


Allergies:  


Coded Allergies:  


     No Known Allergies (Unverified , 10/4/16)


Subjective


Patient more awake and tracking





Objective





Last 24 Hour Vital Signs








  Date Time  Temp Pulse Resp B/P (MAP) Pulse Ox O2 Delivery O2 Flow Rate FiO2


 


4/13/19 04:00       2.0 


 


4/13/19 04:00      Nasal Cannula 2.0 


 


4/13/19 04:00 96.4 60 20 133/51 (78) 96   





  60      


 


4/13/19 04:00 96.1 83 18 114/62 (79) 96   


 


4/13/19 04:00  65      


 


4/13/19 00:00 96.6 75 20 127/47 (73) 97   


 


4/13/19 00:00      Nasal Cannula 2.0 


 


4/13/19 00:00  68      


 


4/13/19 00:00       2.0 


 


4/12/19 20:00 98.0 65 20 98/61 (73) 99   


 


4/12/19 20:00       2.0 


 


4/12/19 20:00  57      


 


4/12/19 20:00      Nasal Cannula 2.0 


 


4/12/19 18:00  53  118/52    


 


4/12/19 16:00      Nasal Cannula 2.0 


 


4/12/19 16:00  53      


 


4/12/19 16:00       2.0 


 


4/12/19 16:00 97.7 61 16 118/52 (74) 99   


 


4/12/19 12:00       2.0 


 


4/12/19 12:00 96.6 61 20 121/55 (77) 99   


 


4/12/19 12:00      Nasal Cannula 2.0 


 


4/12/19 12:00  71      


 


4/12/19 08:42  70  130/62    


 


4/12/19 08:00 97.0 70 22 130/62 (84) 96   


 


4/12/19 08:00  65      


 


4/12/19 08:00       2.0 


 


4/12/19 08:00      Nasal Cannula 2.0 

















Intake and Output  


 


 4/12/19 4/13/19





 18:59 06:59


 


Intake Total 1158.501 ml 1143.301 ml


 


Output Total 340 ml 600 ml


 


Balance 818.501 ml 543.301 ml


 


  


 


Intake Free Water 90 ml 60 ml


 


IV Total 843.501 ml 858.301 ml


 


Tube Feeding 225 ml 225 ml


 


Output Urine Total 340 ml 600 ml


 


# Voids 2 


 


# Bowel Movements 4 2








Laboratory Tests


4/12/19 12:50: Activated Partial Thromboplast Time 64H


4/12/19 20:30: 


Activated Partial Thromboplast Time 73H, Hepatitis A IgM Antibody [Pending], 

Hepatitis B Surface Antigen [Pending], Hepatitis B Core IgM Antibody [Pending], 

Hepatitis C Antibody [Pending]


4/13/19 04:00: 


Activated Partial Thromboplast Time 70H, White Blood Count 12.1H, Red Blood 

Count 4.81, Hemoglobin 13.3, Hematocrit 43.1, Mean Corpuscular Volume 90, Mean 

Corpuscular Hemoglobin 27.7, Mean Corpuscular Hemoglobin Concent 30.9L, Red 

Cell Distribution Width 17.7H, Platelet Count 85L, Mean Platelet Volume 10.9H, 

Neutrophils (%) (Auto) , Lymphocytes (%) (Auto) , Monocytes (%) (Auto) , 

Eosinophils (%) (Auto) , Basophils (%) (Auto) , Neutrophils % (Manual) [Pending]

, Lymphocytes % (Manual) [Pending], Platelet Estimate [Pending], Platelet 

Morphology [Pending], Sodium Level 155H, Potassium Level 3.5, Chloride Level 

116H, Carbon Dioxide Level 33H, Anion Gap 6, Blood Urea Nitrogen 32H, 

Creatinine 0.9, Estimat Glomerular Filtration Rate , Glucose Level 128#H, 

Calcium Level 9.3, Total Creatine Kinase 71, Creatine Kinase MB 1.8, Creatine 

Kinase MB Relative Index 2.5, Troponin I 0.017, Pro-B-Type Natriuretic Peptide 

1467H


Height (Feet):  5


Height (Inches):  5.00


Weight (Pounds):  169


General Appearance:  agitated


EENT:  normal ENT inspection


Neck:  normal alignment, supple


Cardiovascular:  normal rate, regular rhythm


Respiratory/Chest:  rhonchi - bilaterally


Abdomen:  non tender, soft


Edema:  no edema noted Arm (L), no edema noted Arm (R), no edema noted Leg (L), 

no edema noted Leg (R), no edema noted Pedal (L), no edema noted Pedal (R), no 

edema noted Generalized











Sanya Kwan MD Apr 13, 2019 07:51

## 2019-04-13 NOTE — NUR
NURSE NOTES:

Received report from EMMA Merritt RN. Pt is resting in the bed, non verbal, arousable by 
voice. No respiratory distress in 2L NC. GT feeding is running as ordered, no residual, 
patent. Purewick is applied, and suction is ready to use. P200 is applied. Bed alarm on, bed 
in lowest position, and breaks are engaged. Heparin drip is running at 21.46ml/hr, 47128 
unit. IV sites are asymptomatic and patent. Call light, urinal and side table are within 
reach. Will follow plans of care.

## 2019-04-13 NOTE — NUR
HAND-OFF: 

Report given to KRUPA Jay. Patient VS Stable at this time. Patient running Heparin 17units 
per hour at this time. Patient stable. Patient to be transferred to telemetry. Endorsed to 
follow up. Saturday wound care pictures have been taken. Patient is clean and dry. Oral care 
had been done.

## 2019-04-13 NOTE — NUR
NURSE NOTES:

The pharmacist called and new rate is, 14 Unit/ Kg / Hour. The rate will be 21.464 ml/ hour.

## 2019-04-13 NOTE — NUR
NURSE NOTES:

Received report from KRUPA German. Patient is resting in bed, in stable condition. No s/sx of 
SOB, breathing is even and unlabored. Observed no presence of pain or discomfort at this 
time. Heparin drip is running at prescribed dose. Bed is in lowest position, brakes engaged. 
Call light is kept within easy reach. Will continue to monitor patient.

## 2019-04-13 NOTE — NUR
NURSE NOTES:

Received patient via gurney from SAMANTHA, report given by from Pierre RN. Pt is resting in the 
bed, non verbal.  Breathing is even and unlabored in 2 liter Nasal Cannula.  Bed in lowest 
position with 2 side rails up, breaks are engaged. Heparin drip is running. Call light , and 
side table are within reach. Bed alarm on. Will continue to monitor.

## 2019-04-13 NOTE — NUR
NURSE NOTES:

Received patient from KRUPA Zurita. Patient VS stable at this time. Patient able to be aroused 
by name but does not answer questions. Patient does not speak except when she feels pain. 
Patient keeps mouth open and is sleeping at this time. Patient showing sinus rhythm  on the 
monitor. Patient on 2L NC at this time. Patient has G tube that is patent and asymptomatic 
at this time. Patient has a purewick that is in place and patent. Patient has right wrist 
22G and right hand 22G that is running heparin drip at 17units per hour at this time. 
Patient has acute DVT of the right leg with apparent swelling and redness. Will monitor 
circumference. Patient running D5W with 20mEq at 50mL/hr at this time. Next PTT at 0400 
4/13. Patient bed in low position with bed alarm on and call light in reach at this time. 
Patient has small sacral stage 2 pressure ulcer. Patient has perineal redness.

## 2019-04-13 NOTE — NUR
NURSE NOTES:

Called and reported to pharmacist for uncorrected patient's weight. Pharmacist will 
calculate and let me know.

## 2019-04-13 NOTE — NUR
TRANSFER TO FLOOR:

Patient transferred to [facility], per Dr. Kwan.   Report given to KRUPA Davalos. 
Patient has "toy" noted in belongings list, no toy found on patient or drawers, charge nurse 
is made aware, KRUPA Davalos made aware. Family informed of transfer.

## 2019-04-13 NOTE — NUR
SI:ACUTE DVT . HCAP . SEPSIS

VS: /52, P 54, T 97.5, RR 18, SpO2 93 on NC 2.0L O2

WBC 12.1, Na 155, BUN 32





IS:ARICEPT 10mg GT

ATENOLOL 25mg GT





**************MED/SURG STATUS****************

## 2019-04-13 NOTE — NUR
NURSE NOTES:

[place] from microbiolgy reported that this patient has a history of VRE of the Rectum from 
2016.

## 2019-04-14 VITALS — DIASTOLIC BLOOD PRESSURE: 50 MMHG | SYSTOLIC BLOOD PRESSURE: 114 MMHG

## 2019-04-14 VITALS — DIASTOLIC BLOOD PRESSURE: 52 MMHG | SYSTOLIC BLOOD PRESSURE: 108 MMHG

## 2019-04-14 VITALS — DIASTOLIC BLOOD PRESSURE: 50 MMHG | SYSTOLIC BLOOD PRESSURE: 120 MMHG

## 2019-04-14 VITALS — DIASTOLIC BLOOD PRESSURE: 69 MMHG | SYSTOLIC BLOOD PRESSURE: 123 MMHG

## 2019-04-14 VITALS — SYSTOLIC BLOOD PRESSURE: 96 MMHG | DIASTOLIC BLOOD PRESSURE: 40 MMHG

## 2019-04-14 VITALS — DIASTOLIC BLOOD PRESSURE: 55 MMHG | SYSTOLIC BLOOD PRESSURE: 124 MMHG

## 2019-04-14 VITALS — SYSTOLIC BLOOD PRESSURE: 121 MMHG | DIASTOLIC BLOOD PRESSURE: 54 MMHG

## 2019-04-14 LAB
ADD MANUAL DIFF: NO
BASOPHILS NFR BLD AUTO: 0.7 % (ref 0–2)
BUN SERPL-MCNC: 25 MG/DL (ref 7–18)
CALCIUM SERPL-MCNC: 8.7 MG/DL (ref 8.5–10.1)
CHLORIDE SERPL-SCNC: 113 MMOL/L (ref 98–107)
CO2 SERPL-SCNC: 29 MMOL/L (ref 21–32)
CREAT SERPL-MCNC: 0.8 MG/DL (ref 0.55–1.3)
EOSINOPHIL NFR BLD AUTO: 2.5 % (ref 0–3)
ERYTHROCYTE [DISTWIDTH] IN BLOOD BY AUTOMATED COUNT: 17 % (ref 11.6–14.8)
HCT VFR BLD CALC: 37.2 % (ref 37–47)
HGB BLD-MCNC: 11.7 G/DL (ref 12–16)
LYMPHOCYTES NFR BLD AUTO: 14.3 % (ref 20–45)
MCV RBC AUTO: 89 FL (ref 80–99)
MONOCYTES NFR BLD AUTO: 6.1 % (ref 1–10)
NEUTROPHILS NFR BLD AUTO: 76.4 % (ref 45–75)
PLATELET # BLD: 101 K/UL (ref 150–450)
POTASSIUM SERPL-SCNC: 3.1 MMOL/L (ref 3.5–5.1)
RBC # BLD AUTO: 4.19 M/UL (ref 4.2–5.4)
SODIUM SERPL-SCNC: 135 MMOL/L (ref 136–145)
WBC # BLD AUTO: 9.7 K/UL (ref 4.8–10.8)

## 2019-04-14 RX ADMIN — DONEPEZIL HYDROCHLORIDE SCH MG: 5 TABLET, FILM COATED ORAL at 22:10

## 2019-04-14 RX ADMIN — ATENOLOL SCH MG: 25 TABLET ORAL at 21:00

## 2019-04-14 RX ADMIN — DEXTROSE AND POTASSIUM CHLORIDE SCH MLS/HR: 5; .15 SOLUTION INTRAVENOUS at 04:09

## 2019-04-14 RX ADMIN — ATENOLOL SCH MG: 25 TABLET ORAL at 09:46

## 2019-04-14 NOTE — GENERAL PROGRESS NOTE
Assessment/Plan


Assessment/Plan





# Acute DVT (deep venous thrombosis) --  Right common femoral and femoral vein 

thrombosis. This is also described on recent study of 3/13/2019, and it is 

unclear whether current findings represent residual thrombosis or whether there 

is increased thrombus relative to the prior study, based on imaging, will 

consider anticoag


--> heparin gtt has been started and today (4/14) discontinued


--> okay with NOAC or coumadin as an outpatient at [facility]


--> will begin eliquis 5mg po bid to be started at this time x 3 months total


# Thrombocytopenia - potential causes multifactorial,however is very likely 

related to hepatitis C


--> Hep panel and HIV ordered and c/w chronic hep c


--> US abd to evaluate for cirrhosis and hsm to be ordered as op


--> Peripheral smear ordered to evaluate for blasts /schistocytes does not show 

any


--> abx and other meds have been reviewed 


--> ok for ppx if plt >50k w/ either heparin or lovenox 


--> Transfuse if Plt < 20k and fever, or if Plt < 10k without fever 


# Sepsis with HCAP (healthcare-associated pneumonia)


--> on abx at this time


# Congestive heart failure (CHF)


--> per cards as needed


# Hypernatremia


--> fluid management as per renal





The timing of this note does not necessarily reflect the time of the patient 

was seen.





Greatly appreciate consultation!





Subjective


Constitutional:  Denies: no symptoms, chills, diaphoresis, fever, malaise, 

weakness, other


HEENT:  Denies: no symptoms, eye pain, blurred vision, tearing, double vision, 

ear pain, ear discharge, nose pain, nose congestion, throat pain, throat 

swelling, mouth pain, mouth swelling, other


Cardiovascular:  Denies: no symptoms, chest pain, edema, irregular heart rate, 

lightheadedness, palpitations, syncope, other


Respiratory:  Denies: no symptoms, cough, orthopnea, shortness of breath, SOB 

with excertion, SOB at rest, sputum, stridor, wheezing, other


Gastrointestinal/Abdominal:  Denies: no symptoms, abdomen distended, abdominal 

pain, black stools, tarry stools, blood in stool, constipated, diarrhea, 

difficulty swallowing, nausea, poor appetite, poor fluid intake, rectal bleeding

, vomiting, other


Genitourinary:  Denies: no symptoms, burning, discharge, frequency, flank pain, 

hematuria, incontinence, pain, urgency, other


Endocrine:  Denies: no symptoms, excessive sweating, flushing, intolerance to 

cold, intolerance to heat, increased hunger, increased thirst, increased urine, 

unexplained weight gain, unexplained weight loss, other


Allergies:  


Coded Allergies:  


     No Known Allergies (Unverified , 10/4/16)


Subjective


4/14: no fevers or chills, no night sweats, no changes, dced heparin gtt and 

started on eliquis





Objective





Last 24 Hour Vital Signs








  Date Time  Temp Pulse Resp B/P (MAP) Pulse Ox O2 Delivery O2 Flow Rate FiO2


 


4/14/19 12:00 97.3 66 23 121/54 (76) 98   


 


4/14/19 09:46  62  120/50    


 


4/14/19 09:00      Nasal Cannula 2.0 


 


4/14/19 08:00 97.3 62 22 120/50 (73) 96   


 


4/14/19 08:00  53      


 


4/14/19 04:00 97.3 63 20 96/40 (58) 100   


 


4/14/19 03:44  56      


 


4/14/19 00:00 97.4 65 18 124/55 (78) 92   


 


4/13/19 23:39  58      


 


4/13/19 21:00  59  108/52    


 


4/13/19 21:00      Nasal Cannula 2.0 


 


4/13/19 20:00 97.8 59 18 108/52 (70) 93   


 


4/13/19 19:29  54      


 


4/13/19 16:00 97.8 61 18 116/51 (72) 95   


 


4/13/19 16:00  58      

















Intake and Output  


 


 4/13/19 4/14/19





 19:00 07:00


 


Intake Total 833.546 ml 246.712 ml


 


Output Total 400 ml 350 ml


 


Balance 433.546 ml -103.288 ml


 


  


 


Intake Free Water 50 ml 


 


IV Total 543.546 ml 246.712 ml


 


Tube Feeding 240 ml 


 


Output Urine Total 400 ml 


 


Stool Total  350 ml


 


# Bowel Movements 3 1








Laboratory Tests


4/14/19 04:01: 


White Blood Count 9.7, Red Blood Count 4.19L, Hemoglobin 11.7L, Hematocrit 37.2

, Mean Corpuscular Volume 89, Mean Corpuscular Hemoglobin 28.0, Mean 

Corpuscular Hemoglobin Concent 31.5L, Red Cell Distribution Width 17.0H, 

Platelet Count 101L, Mean Platelet Volume 11.8H, Neutrophils (%) (Auto) 76.4H, 

Lymphocytes (%) (Auto) 14.3L, Monocytes (%) (Auto) 6.1, Eosinophils (%) (Auto) 

2.5, Basophils (%) (Auto) 0.7, Activated Partial Thromboplast Time 50H, Sodium 

Level 135L, Potassium Level 3.1L, Chloride Level 113H, Carbon Dioxide Level 29, 

Blood Urea Nitrogen 25H, Creatinine 0.8, Estimat Glomerular Filtration Rate , 

Glucose Level 128H, Calcium Level 8.7


4/14/19 14:00: Heparin-PF4 Antibody Screen [Pending]


Height (Feet):  5


Height (Inches):  5.00


Weight (Pounds):  172


Objective





Physical Exam:


Vitals: reviewed


General Appearance:  NAD


HEENT:  normocephalic, atraumatic


Neck:  non-tender, normal alignment


Respiratory/Chest:  normal breath sounds bilaterally


Cardiovascular/Chest:  normal peripheral pulses, normal rate


Abdomen:  normal bowel sounds, soft, nontender


Extremities:  normal range of motion











Daniel Sierra MD Apr 14, 2019 15:40

## 2019-04-14 NOTE — NUR
TRANSFER TO FLOOR:

Patient transferred to Med-surg, per Dr. Newberry's order.   Report given to KRUPA Hyde.  
Patient had no belonging. Patient transferred in stable condition.

## 2019-04-14 NOTE — NUR
HAND-OFF: 

Report given to EMMA Merritt RN.

-------------------------------------------------------------------------------

Addendum: 04/14/19 at 0755 by ANA JOHNSON RN

-------------------------------------------------------------------------------

Endorsed that next ptt time 1315 and new rate of heparin drip.

## 2019-04-14 NOTE — NUR
NURSE NOTES:

Pt d/c  two IV insertion by her hands and mouth. Pt is non verbal. New IV was given. 
Afebril, VSS. Pt had BM, brown colored soft small amount. Pt was cleaned, changed, and 
repositioned. Will continue to monitor.

## 2019-04-14 NOTE — NUR
NURSE NOTES:

Patient was on Heparin drip. Per Dr. Sierra's order Heparin drip discontinued. Patient 
will be on Eliquis 10 mg BID starting at 2100. none

## 2019-04-14 NOTE — NUR
NURSE NOTES:

Received report from KRUPA Galdamez. Pt is resting in the bed, non verbal.  Breathing is even 
and unlabored in 2 liter Nasal Cannula.  Bed in lowest position with 2 side rails up, breaks 
are engaged. Heparin drip is running, need to contact the pharmacist to verify new patient's 
weight . Call light , and side table are within reach. Bed alarm on. Will continue to 
monitor.

## 2019-04-14 NOTE — NUR
SI:ACUTE DVT . HCAP . SEPSIS

VS: BP 96/40, P 53, T 97.3, RR 22, SpO2 96 on 2.0L O2 NC

RBC 4.19, Hgb 11.7, Na 135, K 3.1, BUN 25



IS:CEFTRIAXONE 55ml IVPB

APIXABAN 10mg 

ATENOLOL 25mg GT

ARICEPT 10mg GT

PREVACID 30mg GT

CEFTRIAXONE 55ml IVPB

ATENOLOL 25mg GT





****************MED/SURG STATUS****************

## 2019-04-14 NOTE — NUR
NURSE NOTES:

Patient in bed, awake, unable to make needs known. Responds to name and touch. Abdomen is 
soft, noted with Gtube, feeding is infusing. IV site on the right forearm, noted. Patient 
pulled out right hand iv. Skin is warm and dry to touch. Sacral DTI noted, dressing noted. 
Respiration is even, nasal cannula noted. Bed in low and locked position. Provided safe 
environment. Call light is at bedside.

## 2019-04-14 NOTE — NUR
NURSE NOTES:

Patient had IV on the Right-Hand and Right For-arm flushes well. Right hand had ecchymosis. 
IV cite and G-tube sites covered so that patient tiff't pull it out.

## 2019-04-14 NOTE — NEPHROLOGY PROGRESS NOTE
Assessment/Plan


Assessment/Plan


A/P





1) Hypernatremia- resolved. D5W stopped


2) Hypokalemia- 20 meq KCl today


3) Right common femoral and femoral vein thrombosis- heparin gtt to be stopped 

and eliquis started


     - has IVC filter, - Heme to manage


4) Sepsis- urosepsis


     -Rocephin, WBC improved


5) ACS/- medical mgmt per cardiology





Subjective


Date patient seen:  Apr 14, 2019


Time patient seen:  07:43


ROS Limited/Unobtainable:  Yes


Allergies:  


Coded Allergies:  


     No Known Allergies (Unverified , 10/4/16)


Subjective


Patient more awake and tracking





Objective





Last 24 Hour Vital Signs








  Date Time  Temp Pulse Resp B/P (MAP) Pulse Ox O2 Delivery O2 Flow Rate FiO2


 


4/14/19 04:00 97.3 63 20 96/40 (58) 100   


 


4/14/19 03:44  56      


 


4/14/19 00:00 97.4 65 18 124/55 (78) 92   


 


4/13/19 23:39  58      


 


4/13/19 21:00  59  108/52    


 


4/13/19 21:00      Nasal Cannula 2.0 


 


4/13/19 20:00 97.8 59 18 108/52 (70) 93   


 


4/13/19 19:29  54      


 


4/13/19 16:00 97.8 61 18 116/51 (72) 95   


 


4/13/19 16:00  58      


 


4/13/19 12:00       2.0 


 


4/13/19 12:00  57      


 


4/13/19 12:00      Nasal Cannula 2.0 


 


4/13/19 12:00 97.5 59 19 113/54 (73) 96   


 


4/13/19 09:18  65  124/67    


 


4/13/19 08:00      Nasal Cannula 2.0 


 


4/13/19 08:00 96.4 65 18 124/67 (86) 95   


 


4/13/19 08:00       2.0 


 


4/13/19 08:00  64      

















Intake and Output  


 


 4/13/19 4/14/19





 19:00 07:00


 


Intake Total 833.546 ml 246.712 ml


 


Output Total 400 ml 350 ml


 


Balance 433.546 ml -103.288 ml


 


  


 


Intake Free Water 50 ml 


 


IV Total 543.546 ml 246.712 ml


 


Tube Feeding 240 ml 


 


Output Urine Total 400 ml 


 


Stool Total  350 ml


 


# Bowel Movements 3 1








Laboratory Tests


4/14/19 04:01: 


White Blood Count 9.7, Red Blood Count 4.19L, Hemoglobin 11.7L, Hematocrit 37.2

, Mean Corpuscular Volume 89, Mean Corpuscular Hemoglobin 28.0, Mean 

Corpuscular Hemoglobin Concent 31.5L, Red Cell Distribution Width 17.0H, 

Platelet Count 101L, Mean Platelet Volume 11.8H, Neutrophils (%) (Auto) 76.4H, 

Lymphocytes (%) (Auto) 14.3L, Monocytes (%) (Auto) 6.1, Eosinophils (%) (Auto) 

2.5, Basophils (%) (Auto) 0.7, Activated Partial Thromboplast Time 50H, Sodium 

Level 135L, Potassium Level 3.1L, Chloride Level 113H, Carbon Dioxide Level 29, 

Blood Urea Nitrogen 25H, Creatinine 0.8, Estimat Glomerular Filtration Rate , 

Glucose Level 128H, Calcium Level 8.7


Height (Feet):  5


Height (Inches):  5.00


Weight (Pounds):  169


General Appearance:  no apparent distress


EENT:  normal ENT inspection


Neck:  normal alignment, supple


Cardiovascular:  normal rate, regular rhythm


Respiratory/Chest:  rhonchi - bilaterally


Abdomen:  non tender, soft


Edema:  2+ Arm (R)











Sanya Kwan MD Apr 14, 2019 07:45

## 2019-04-14 NOTE — NUR
NURSE NOTES:

Patient transferred from [facility] to [facility] around 1645 by bed accompanied by transfering 
nurse, Susannah. Glucernal 1.2 running at 40 cc running well, no residual noticed and flushes 
well; patient non verbal, on nasal cannula at 2 liter, no sing of distress and shortness of 
breath; pictures taken on sacral and left and right heel, also wound care provided; patient 
had perineal area redness and triad cream applied. belonging lists singed by transferring 
nurse and receiving nurse, which patient doesn't have any belongings. Right foot had DVT and 
swollen, elevated by pillow. vitals taken upon arrival to the unite. Purick in place. head 
of the bed elevated, side rails up x2, breaks engaged. will keep monitoring.

## 2019-04-15 VITALS — SYSTOLIC BLOOD PRESSURE: 121 MMHG | DIASTOLIC BLOOD PRESSURE: 53 MMHG

## 2019-04-15 VITALS — DIASTOLIC BLOOD PRESSURE: 55 MMHG | SYSTOLIC BLOOD PRESSURE: 112 MMHG

## 2019-04-15 VITALS — DIASTOLIC BLOOD PRESSURE: 67 MMHG | SYSTOLIC BLOOD PRESSURE: 122 MMHG

## 2019-04-15 VITALS — DIASTOLIC BLOOD PRESSURE: 63 MMHG | SYSTOLIC BLOOD PRESSURE: 118 MMHG

## 2019-04-15 VITALS — SYSTOLIC BLOOD PRESSURE: 124 MMHG | DIASTOLIC BLOOD PRESSURE: 63 MMHG

## 2019-04-15 LAB
ADD MANUAL DIFF: NO
ANION GAP SERPL CALC-SCNC: 4 MMOL/L (ref 5–15)
BASOPHILS NFR BLD AUTO: 1 % (ref 0–2)
BUN SERPL-MCNC: 22 MG/DL (ref 7–18)
CALCIUM SERPL-MCNC: 8.5 MG/DL (ref 8.5–10.1)
CHLORIDE SERPL-SCNC: 114 MMOL/L (ref 98–107)
CO2 SERPL-SCNC: 29 MMOL/L (ref 21–32)
CREAT SERPL-MCNC: 0.7 MG/DL (ref 0.55–1.3)
EOSINOPHIL NFR BLD AUTO: 2.6 % (ref 0–3)
ERYTHROCYTE [DISTWIDTH] IN BLOOD BY AUTOMATED COUNT: 17.4 % (ref 11.6–14.8)
HCT VFR BLD CALC: 36.6 % (ref 37–47)
HGB BLD-MCNC: 11.5 G/DL (ref 12–16)
LYMPHOCYTES NFR BLD AUTO: 17.9 % (ref 20–45)
MCV RBC AUTO: 89 FL (ref 80–99)
MONOCYTES NFR BLD AUTO: 7.9 % (ref 1–10)
NEUTROPHILS NFR BLD AUTO: 70.6 % (ref 45–75)
PLATELET # BLD: 107 K/UL (ref 150–450)
POTASSIUM SERPL-SCNC: 4.2 MMOL/L (ref 3.5–5.1)
RBC # BLD AUTO: 4.1 M/UL (ref 4.2–5.4)
SODIUM SERPL-SCNC: 147 MMOL/L (ref 136–145)
WBC # BLD AUTO: 7.5 K/UL (ref 4.8–10.8)

## 2019-04-15 RX ADMIN — DONEPEZIL HYDROCHLORIDE SCH MG: 5 TABLET, FILM COATED ORAL at 20:40

## 2019-04-15 RX ADMIN — SODIUM CHLORIDE AND POTASSIUM CHLORIDE SCH MLS/HR: 4.5; 1.49 INJECTION, SOLUTION INTRAVENOUS at 18:38

## 2019-04-15 RX ADMIN — ATENOLOL SCH MG: 25 TABLET ORAL at 20:43

## 2019-04-15 RX ADMIN — ATENOLOL SCH MG: 25 TABLET ORAL at 09:34

## 2019-04-15 RX ADMIN — APIXABAN SCH MG: 2.5 TABLET, FILM COATED ORAL at 09:34

## 2019-04-15 RX ADMIN — APIXABAN SCH MG: 2.5 TABLET, FILM COATED ORAL at 20:43

## 2019-04-15 RX ADMIN — ATENOLOL SCH MG: 25 TABLET ORAL at 20:56

## 2019-04-15 NOTE — NUR
NURSE NOTES:

Received patient on bed, awake. IV site intact and patent. Gtube present and intact and 
patent. Dressing intact. Bed in low and locked position, call light in reach. No signs of 
respiratory distress or pain. Room board updated, will continue to monitor.

## 2019-04-15 NOTE — NUR
NURSE NOTES:

Received patient in bed, resting, family members at bedside. Patient is alert x1, slightly 
confused. Patient in on NS 2 ml. No s/s of distress. Tube feeding running at 40ml/hr. 
Patient is on aspiration precaution, HOB up at 30 degrees. Bed is at the lowest position, 
bed alarms active, side rails up 2x, call light within reach. Will continue to monitor.

## 2019-04-15 NOTE — PROGRESS NOTE
DATE:  04/13/2019

CARDIOLOGY PROGRESS NOTE



Late entry for 04/13/2019.



SUBJECTIVE:  Awake, alert, and interactive, but still not at baseline.



OBJECTIVE:

VITAL SIGNS:  Blood pressure 133/51, pulse 60, and respirations 20.

LUNGS:  Coarse breath sounds.  No wheezing.

HEART:  Regular rhythm and rate.  Normal S1, S2.

ABDOMEN:  Soft.

EXTREMITIES:  Trace edema.



LABORATORY DATA:  White count 12 and hemoglobin 13.  Sodium 155, potassium

3.5, bicarbonate 33, BUN 32, and creatinine 0.9.  Troponin negative.



IMPRESSION:

1. Dehydration.

2. Hypernatremia.

3. Hyperchloremia.

4. Prerenal azotemia.

5. History of DVT.

6. History of IVC filter.

7. CVA with left weakness.

8. Sepsis.



PLAN:

1. Antimicrobials.

2. Hypotonic IV's.

3. Once rehydrated, we would consider discontinuing heparin in view of

prior history of complications.

4. Maintain current cardiovascular regimen.

5. Follow up chemistry panel.









  ______________________________________________

  Misbah Francis M.D.





DR:  WILLIE

D:  04/15/2019 03:04

T:  04/15/2019 03:17

JOB#:  [id number]

CC:

## 2019-04-15 NOTE — PROGRESS NOTE
DATE:  04/14/2019

SUBJECTIVE:  The patient is being transitioned to oral anticoagulants.  In

the past, she has had complications due to bleeding.



OBJECTIVE:

LUNGS:  Coarse breath sounds.  No wheezes.

HEART:  Regular rhythm and rate.  Normal S1, S2.

ABDOMEN:  Soft.

EXTREMITIES:  Trace edema.



IMPRESSION:  History of IVC filter.



PLAN:

1. Reassess risk benefit ratio of anticoagulation.

2. Decrease Eliquis to 5 mg twice a day.

3. Continue current cardiovascular regimen.

4. Replace electrolytes.









  ______________________________________________

  Misbah Francis M.D.





DR:  WILLIE

D:  04/15/2019 03:07

T:  04/15/2019 03:20

JOB#:  [id number]

CC:

## 2019-04-15 NOTE — NUR
*-* INSURANCE *-*



ALL CLINICALS AND REVIEW HAVE BEEN FAXED TO:



[facility] 

P: 

F: [phone]

NCM: LUCIO DELACRUZ

P: [phone]

F: [phone] (FAX CLINICALS)



[facility]

NO NCM ASSIGNED. DELEGATED TO [facility]

FAX ALL CLINICALS  [phone]

## 2019-04-15 NOTE — NUR
SI:ACUTE DVT . HCAP . SEPSIS

VS: /67, P 63, T 96.9, RR 17, SpO2 96 on NC 2.0L O2

Na 147, BUN 22, RBC 4.10, Hgb 11.5, Hct 36.6



IS:ATENOLOL 25mg

APIXABAN 5mg 

PREVACID 30mg 



******************MED/SURG STATUS******************

## 2019-04-15 NOTE — GENERAL PROGRESS NOTE
Assessment/Plan


Assessment:





# Acute DVT (deep venous thrombosis) --  Right common femoral and femoral vein 

thrombosis. This is also described on recent study of 3/13/2019, and it is 

unclear whether current findings represent residual thrombosis or whether there 

is increased thrombus relative to the prior study, based on imaging, will 

consider anticoag HX of iv FILTER


--> heparin gtt has been started and today (4/14) discontinued


--> okay with NOAC or coumadin as an outpatient at [facility]


--> continue on eliuis 5mg po bid to be started at this time x 3 months total


--> importanly does have a history of ivc filter


# Thrombocytopenia - potential causes multifactorial,however is very likely 

related to hepatitis C


--> Hep panel and HIV ordered and c/w chronic hep c


--> US abd to evaluate for cirrhosis and hsm to be ordered as op


--> Peripheral smear ordered to evaluate for blasts /schistocytes does not show 

any


--> abx and other meds have been reviewed 


--> ok for ppx if plt >50k w/ either heparin or lovenox 


--> Transfuse if Plt < 20k and fever, or if Plt < 10k without fever 


# Sepsis with HCAP (healthcare-associated pneumonia)


--> on abx at this time


# Congestive heart failure (CHF)


--> per cards as needed


# Hypernatremia


--> fluid management as per renal





The timing of this note does not necessarily reflect the time of the patient 

was seen.





Greatly appreciate consultation!





Subjective


Constitutional:  Denies: no symptoms, chills, diaphoresis, fever, malaise, 

weakness, other


HEENT:  Denies: no symptoms, eye pain, blurred vision, tearing, double vision, 

ear pain, ear discharge, nose pain, nose congestion, throat pain, throat 

swelling, mouth pain, mouth swelling, other


Cardiovascular:  Denies: no symptoms, chest pain, edema, irregular heart rate, 

lightheadedness, palpitations, syncope, other


Respiratory:  Denies: no symptoms, cough, orthopnea, shortness of breath, SOB 

with excertion, SOB at rest, sputum, stridor, wheezing, other


Gastrointestinal/Abdominal:  Denies: no symptoms, abdomen distended, abdominal 

pain, black stools, tarry stools, blood in stool, constipated, diarrhea, 

difficulty swallowing, nausea, poor appetite, poor fluid intake, rectal bleeding

, vomiting, other


Genitourinary:  Denies: no symptoms, burning, discharge, frequency, flank pain, 

hematuria, incontinence, pain, urgency, other


Endocrine:  Denies: no symptoms, excessive sweating, flushing, intolerance to 

cold, intolerance to heat, increased hunger, increased thirst, increased urine, 

unexplained weight gain, unexplained weight loss, other


Hematologic/Lymphatic:  Denies: no symptoms, anemia, easy bleeding, easy 

bruising, other


Allergies:  


Coded Allergies:  


     No Known Allergies (Unverified , 10/4/16)


Subjective


4/14: no fevers or chills, no night sweats, no changes, dced heparin gtt and 

started on eliquis


4/15: remains on oral anticoagulant, no complaints





Objective





Last 24 Hour Vital Signs








  Date Time  Temp Pulse Resp B/P (MAP) Pulse Ox O2 Delivery O2 Flow Rate FiO2


 


4/15/19 16:00 97.2 63 20 124/63 (83) 95   


 


4/15/19 12:00 97.4 61 20 121/53 (75) 98   


 


4/15/19 09:34  63  122/67    


 


4/15/19 09:00      Nasal Cannula 2.0 


 


4/15/19 04:00 96.9 63 18 122/67 (85) 96   


 


4/15/19 00:00 97.9 60 17 118/63 (81) 96   


 


4/14/19 21:00  60  114/50    


 


4/14/19 21:00      Nasal Cannula 2.0 


 


4/14/19 20:00 97.7 60 16 114/50 (71) 96   

















Intake and Output  


 


 4/14/19 4/15/19





 18:59 06:59


 


Intake Total 148.26 ml 480 ml


 


Balance 148.26 ml 480 ml


 


  


 


IV Total 28.26 ml 


 


Tube Feeding 120 ml 480 ml


 


# Voids  2


 


# Bowel Movements 1 1








Laboratory Tests


4/15/19 05:10: 


White Blood Count 7.5, Red Blood Count 4.10L, Hemoglobin 11.5L, Hematocrit 36.6L

, Mean Corpuscular Volume 89, Mean Corpuscular Hemoglobin 28.1, Mean 

Corpuscular Hemoglobin Concent 31.4L, Red Cell Distribution Width 17.4H, 

Platelet Count 107L, Mean Platelet Volume 13.1H, Neutrophils (%) (Auto) 70.6, 

Lymphocytes (%) (Auto) 17.9L, Monocytes (%) (Auto) 7.9, Eosinophils (%) (Auto) 

2.6, Basophils (%) (Auto) 1.0, Sodium Level 147H, Potassium Level 4.2, Chloride 

Level 114H, Carbon Dioxide Level 29, Anion Gap 4L, Blood Urea Nitrogen 22H, 

Creatinine 0.7, Estimat Glomerular Filtration Rate , Glucose Level 113H, 

Calcium Level 8.5


Height (Feet):  5


Height (Inches):  5.00


Weight (Pounds):  172


Objective





Physical Exam:


Vitals: reviewed


General Appearance:  NAD


HEENT:  normocephalic, atraumatic


Neck:  non-tender, normal alignment


Respiratory/Chest:  normal breath sounds bilaterally


Cardiovascular/Chest:  normal peripheral pulses, normal rate


Abdomen:  normal bowel sounds, soft, nontender


Extremities:  normal range of motion











Daniel Sierra MD Apr 15, 2019 16:48

## 2019-04-15 NOTE — GENERAL PROGRESS NOTE
Assessment/Plan


Problem List:  


(1) Dehydration


ICD Codes:  E86.0 - Dehydration


SNOMED:  32817486


(2) Obesity (BMI 30.0-34.9)


ICD Codes:  E66.9 - Obesity, unspecified


SNOMED:  676351135


(3) CVA (cerebral vascular accident)


ICD Codes:  I63.9 - Cerebral infarction, unspecified


SNOMED:  330245917


(4) Alzheimer's dementia


ICD Codes:  G30.9 - Alzheimer's disease, unspecified


SNOMED:  40467897


(5) DVT (deep venous thrombosis)


ICD Codes:  I82.409 - Acute embolism and thrombosis of unspecified deep veins 

of unspecified lower extremity


SNOMED:  569525622


(6) Hypernatremia


ICD Codes:  E87.0 - Hyperosmolality and hypernatremia


SNOMED:  359830043


Assessment:


improving, on eliquis now, plan ecf am if stable





Subjective


ROS Limited/Unobtainable:  Yes


Allergies:  


Coded Allergies:  


     No Known Allergies (Unverified , 10/4/16)





Objective





Last 24 Hour Vital Signs








  Date Time  Temp Pulse Resp B/P (MAP) Pulse Ox O2 Delivery O2 Flow Rate FiO2


 


4/15/19 16:00 97.2 63 20 124/63 (83) 95   


 


4/15/19 12:00 97.4 61 20 121/53 (75) 98   


 


4/15/19 09:34  63  122/67    


 


4/15/19 09:00      Nasal Cannula 2.0 


 


4/15/19 04:00 96.9 63 18 122/67 (85) 96   


 


4/15/19 00:00 97.9 60 17 118/63 (81) 96   


 


4/14/19 21:00  60  114/50    


 


4/14/19 21:00      Nasal Cannula 2.0 


 


4/14/19 20:00 97.7 60 16 114/50 (71) 96   

















Intake and Output  


 


 4/14/19 4/15/19





 18:59 06:59


 


Intake Total 148.26 ml 480 ml


 


Balance 148.26 ml 480 ml


 


  


 


IV Total 28.26 ml 


 


Tube Feeding 120 ml 480 ml


 


# Voids  2


 


# Bowel Movements 1 1








Laboratory Tests


4/15/19 05:10: 


White Blood Count 7.5, Red Blood Count 4.10L, Hemoglobin 11.5L, Hematocrit 36.6L

, Mean Corpuscular Volume 89, Mean Corpuscular Hemoglobin 28.1, Mean 

Corpuscular Hemoglobin Concent 31.4L, Red Cell Distribution Width 17.4H, 

Platelet Count 107L, Mean Platelet Volume 13.1H, Neutrophils (%) (Auto) 70.6, 

Lymphocytes (%) (Auto) 17.9L, Monocytes (%) (Auto) 7.9, Eosinophils (%) (Auto) 

2.6, Basophils (%) (Auto) 1.0, Sodium Level 147H, Potassium Level 4.2, Chloride 

Level 114H, Carbon Dioxide Level 29, Anion Gap 4L, Blood Urea Nitrogen 22H, 

Creatinine 0.7, Estimat Glomerular Filtration Rate , Glucose Level 113H, 

Calcium Level 8.5


Height (Feet):  5


Height (Inches):  5.00


Weight (Pounds):  172


General Appearance:  no apparent distress, confused


EENT:  normal ENT inspection


Neck:  normal alignment


Cardiovascular:  normal rate, regular rhythm


Respiratory/Chest:  lungs clear, normal breath sounds


Abdomen:  non tender, soft


Edema:  mild edema


Neurologic:  disoriented











Presley Lomax MD Apr 15, 2019 17:47

## 2019-04-16 VITALS — DIASTOLIC BLOOD PRESSURE: 70 MMHG | SYSTOLIC BLOOD PRESSURE: 122 MMHG

## 2019-04-16 VITALS — DIASTOLIC BLOOD PRESSURE: 66 MMHG | SYSTOLIC BLOOD PRESSURE: 119 MMHG

## 2019-04-16 VITALS — SYSTOLIC BLOOD PRESSURE: 144 MMHG | DIASTOLIC BLOOD PRESSURE: 64 MMHG

## 2019-04-16 VITALS — SYSTOLIC BLOOD PRESSURE: 113 MMHG | DIASTOLIC BLOOD PRESSURE: 58 MMHG

## 2019-04-16 VITALS — SYSTOLIC BLOOD PRESSURE: 140 MMHG | DIASTOLIC BLOOD PRESSURE: 61 MMHG

## 2019-04-16 RX ADMIN — SODIUM CHLORIDE AND POTASSIUM CHLORIDE SCH MLS/HR: 4.5; 1.49 INJECTION, SOLUTION INTRAVENOUS at 08:49

## 2019-04-16 RX ADMIN — APIXABAN SCH MG: 2.5 TABLET, FILM COATED ORAL at 10:39

## 2019-04-16 RX ADMIN — ATENOLOL SCH MG: 25 TABLET ORAL at 10:38

## 2019-04-16 NOTE — NUR
NURSE NOTES:

Patient was discharged to [facility] accompanied by two ambulance personnel via 
gurney. Prior to discharge, patient's V/S and condition was stable. Inter facility report 
given to Ilana GENTILE. Prior to discharge order, patient was seen by Dr. Lomax and Dr. Lomax 
aware of sodium level of 147 with no new order. Skin assessment done, no new skin issue, no 
skin break. IV and ID was removed. Patient's family Alba aware of discharge. No belongings. 
Packet given to ambulance personnel.GT intact and flushed and secured.

## 2019-04-16 NOTE — NUR
PT. WILL DC TO 

[facility]

ROOM 11B

SKILLED 

[phone] FOR NURSE TO NURSE REPORT 

[facility] WILL  AT [phone]

## 2019-04-16 NOTE — NUR
NURSE NOTES:

Received patient in bed,awake, No s/s of pain or discomfort per [facility] pain scale. HOB 
elevated for aspiration precaution. GT intact,no residual @ this time. Two IV's intact, no 
s/s of infiltration running IVF. Noted with purewick.Her right is larger than the other one 
measured 53cm. no changes since previous shift. On eliquis for DVT prophylaxis. Will 
continue plan of care.

## 2019-04-16 NOTE — GENERAL PROGRESS NOTE
Assessment/Plan


Assessment:





# Acute DVT (deep venous thrombosis) --  Right common femoral and femoral vein 

thrombosis. This is also described on recent study of 3/13/2019, and it is 

unclear whether current findings represent residual thrombosis or whether there 

is increased thrombus relative to the prior study, based on imaging, will 

consider anticoag HX of iv FILTER


--> heparin gtt has been started and today (4/14) discontinued


--> okay with NOAC or coumadin as an outpatient at [facility]


--> continue on eliuis 5mg po bid to be started at this time x 3 months total


--> importanly does have a history of ivc filter


# Thrombocytopenia - potential causes multifactorial,however is very likely 

related to hepatitis C


--> Hep panel and HIV ordered and c/w chronic hep c


--> US abd to evaluate for cirrhosis and hsm to be ordered as op


--> Peripheral smear ordered to evaluate for blasts /schistocytes does not show 

any


--> abx and other meds have been reviewed 


--> ok for ppx if plt >50k w/ either heparin or lovenox 


--> Transfuse if Plt < 20k and fever, or if Plt < 10k without fever 


# Sepsis with HCAP (healthcare-associated pneumonia)


--> on abx at this time


# Congestive heart failure (CHF)


--> per cards as needed


# Hypernatremia


--> fluid management as per renal





The timing of this note does not necessarily reflect the time of the patient 

was seen.





Greatly appreciate consultation!





Subjective


Constitutional:  Denies: no symptoms, chills, diaphoresis, fever, malaise, 

weakness, other


HEENT:  Denies: no symptoms, eye pain, blurred vision, tearing, double vision, 

ear pain, ear discharge, nose pain, nose congestion, throat pain, throat 

swelling, mouth pain, mouth swelling, other


Cardiovascular:  Denies: no symptoms, chest pain, edema, irregular heart rate, 

lightheadedness, palpitations, syncope, other


Respiratory:  Denies: no symptoms, cough, orthopnea, shortness of breath, SOB 

with excertion, SOB at rest, sputum, stridor, wheezing, other


Genitourinary:  Denies: no symptoms, burning, discharge, frequency, flank pain, 

hematuria, incontinence, pain, urgency, other


Neurologic/Psychiatric:  Denies: no symptoms, anxiety, depressed, emotional 

problems, headache, numbness, paresthesia, pre-existing deficit, seizure, 

tingling, tremors, weakness, other


Hematologic/Lymphatic:  Denies: no symptoms, anemia, easy bleeding, easy 

bruising, other


Allergies:  


Coded Allergies:  


     No Known Allergies (Unverified , 10/4/16)


Subjective


4/14: no fevers or chills, no night sweats, no changes, dced heparin gtt and 

started on eliquis


4/15: remains on oral anticoagulant, no complaints


4/16: no events to report, no fevers or chills are noted, no night sweats





Objective





Last 24 Hour Vital Signs








  Date Time  Temp Pulse Resp B/P (MAP) Pulse Ox O2 Delivery O2 Flow Rate FiO2


 


4/16/19 13:50      Nasal Cannula 2.0 


 


4/16/19 12:00 97.4 72 18 119/66 (83) 94   


 


4/16/19 10:38  72  143/65    


 


4/16/19 08:00 97.4 67 20 140/61 (87) 95   


 


4/16/19 04:37 97.1 64 20 113/58 (76) 98   


 


4/16/19 00:00 97.3 67 21 122/70 (87) 94   


 


4/15/19 21:00      Nasal Cannula 2.0 


 


4/15/19 20:43  63  124/63    


 


4/15/19 20:00 97.6 63 17 112/55 (74) 95   


 


4/15/19 16:00 97.2 63 20 124/63 (83) 95   

















Intake and Output  


 


 4/15/19 4/16/19





 18:59 06:59


 


Intake Total 600 ml 1075 ml


 


Balance 600 ml 1075 ml


 


  


 


Intake Free Water 120 ml 110 ml


 


IV Total  525 ml


 


Tube Feeding 480 ml 440 ml


 


# Voids 3 


 


# Bowel Movements 2 








Height (Feet):  5


Height (Inches):  5.00


Weight (Pounds):  172


Objective





Physical Exam:


Vitals: reviewed


General Appearance:  NAD


HEENT:  normocephalic, atraumatic


Neck:  non-tender, normal alignment


Respiratory/Chest:  normal breath sounds bilaterally


Cardiovascular/Chest:  normal peripheral pulses, normal rate


Abdomen:  normal bowel sounds, soft, nontender


Extremities:  normal range of motion











Daniel Sierra MD Apr 16, 2019 15:50

## 2019-04-16 NOTE — NUR
CASE MANAGE REVIEW



SI:ACUTE DVT . HCAP . SEPSIS

VS: /55, P 63, T 97.1, RR 21, SpO2 98 on 2.0L O2 NC



IS:SODIUM x1L IV

PREVACID 30mg GT

APIXABAN 5mg 

DONEPEZIL 10mg GT

LORAZEPAM 0.5mg IV



******************MED/SURG STATUS******************

## 2019-04-16 NOTE — PROGRESS NOTE
DATE:  04/15/2019

CARDIOLOGY PROGRESS NOTE



SUBJECTIVE:  The patient is on full anticoagulation with Eliquis.  No

bleeding signs noted.  She has recurring lower extremity DVTs and an IVC

filter.



OBJECTIVE:

VITAL SIGNS:  Blood pressure 124/63, pulse 63, and respiratory rate 20.

Afebrile.  Left weakness.

LUNGS:  Clear.

CARDIAC:  Regular rhythm and rate.  Normal S1, S2 with a fourth heart sound

and a 1/6 systolic apical murmur.

ABDOMEN:  Soft.

EXTREMITIES:  Without edema.



IMPRESSION:

1. DVT.

2. Hypercoagulability.

3. Acute myocardial ischemia, recovered.

4. Hypercoagulable state with IVC filter.

5. Dehydration and hypernatremia, resolved.

6. Acute on chronic kidney injury, recovered.

7. Sepsis due to urinary tract infection, now treated.

8. Dysphagia with G-tube.

9. Moderate protein-calorie malnutrition.



PLAN OF CARE:

1. Observe for bleeding.

2. Monitor hemoglobin.

3. Full anticoagulation for now.

4. Maintain adequate hydration.

5. Skin care.

6. Protein supplement.

7. No change in current cardiovascular regimen.









  ______________________________________________

  Misbah Francis M.D. DR:  WILLIE

D:  04/16/2019 01:47

T:  04/16/2019 02:38

JOB#:  [id number]

CC:

## 2019-04-16 NOTE — NUR
NURSE NOTES:

Patient was discharged to [facility] 

-------------------------------------------------------------------------------

Addendum: 04/16/19 at 1859 by CECELIA DE PAZ RN

-------------------------------------------------------------------------------

wrong entry

## 2019-04-16 NOTE — NUR
NURSE NOTES:

Rn received call from Dr. Lomax. Dr. Lomax said to make sure patient has the bed so she can go 
back to SNF. RN let Rosa Maria LUJAN know.She will follow up.

## 2019-04-16 NOTE — NUR
NURSE NOTES:

Interfacility report given to Ilana RN @ [facility]. Awaiting for the 
ambulance. pictures obtained and upload on sacral, left and right heel.No other skin issue.

## 2019-04-16 NOTE — NUR
RD ASSESSMENT & RECOMMENDATIONS

SEE CARE ACTIVITY FOR COMPLETE ASSESSMENT



DAILY ESTIMATED NEEDS:

Needs based on Sepsis, wound/ 71kg 

25-30  kcals/kg 

7184-9097  total kcals

1.25-2  g protein/kg

  g total protein 

25-30  mL/kg

2646-1141  total fluid mLs



NUTRITION DIAGNOSIS:

1) Swallowing difficulty r/t dysphagia as evidenced by PEG dep.

2) Altered nutrition related lab values R/T clinical condition, Sepsis as

evidenced by elev BGs (242 210-> 113 128 improved) elev Na (161*->135-> 147

back up), elev wbc (wbc 22.5* -> normalized)





CURRENT TF:Glucerna 1.2 @ 40ml/hr x 24 hrs 





ENTERAL NUTRITION RECOMMENDATIONS:

Glucerna 1.2 @ 65ml/hr x 24 hrs  to provide 1560ml, 1872kcal, 94g prot, 1256ml free water 



- Increase goal rate to 65ml/hr x 24 hrs

- Flush per MD/ HOB over 30 degrees







ADDITIONAL RECOMMENDATIONS:

* Recalibrate bed scale for accurate CBW  

* Per SNF: pt's HT=67", BQ=147kjp (4/2/19) 

* Wound care: RHEA BID 

        -> Rec wound eval by WC specialist 

* A1C for eval of glycemic control- BGs in 200's upon adm, now improved 

* Monitor BGs closely, need for SSI 

* Rec DC 1/2NS IVF and increase water flushes instead-> elev Na

## 2019-04-16 NOTE — NUR
RESPIRATORY NOTE: Rn request Nt suction due to pt having mild rhonchi & pt's inability to 
expectorate independently. Rn professor/teacher at bedside with a student asked to preform 
the suctioning. Ok per RN & myself. Instructor & student will preform NT suctioning at this 
time. Pt in no distress whatsoever at rest in bed on 2liters, all vitals and 02 sat >92%. 
Will continue to closely monitor.

## 2019-04-17 NOTE — PROGRESS NOTE
DATE:  04/16/2019

CARDIOLOGY PROGRESS NOTE



SUBJECTIVE:  No shortness of breath.  No chest pain noted.  The case was

discussed with Dr. Lomax.  The patient has an IVC filter and is at risk of

bleeding complication.   Discussions with family members in the past has

been undertaken and it has been felt that anticoagulation _____ necessary

increased risk at this time.  As such, Eliquis will be discontinued.



OBJECTIVE:

VITAL SIGNS:  Blood pressure 119/66, pulse 72, and respiratory rate 18.

Afebrile.

LUNGS:  Coarse breath sounds.

CARDIOVASCULAR:  Regular rhythm and rate.  Normal S1, S2.

ABDOMEN:  Soft.  G-tube intact.

EXTREMITIES:  Left weakness.  No edema.



IMPRESSION:

1. DVT.

2. IVC filter.

3.  Coronary artery disease with acute myocardial ischemia, recovered.

 

4. Resolved hypernatremia and dehydration.

5. Recovered acute renal failure.

6. Resolved sepsis due to urinary tract infection.

7. History of respiratory failure due to aspiration and cerebrovascular

disease.



PLAN:

1. Outpatient followup.

2. Discharge medication regimen reviewed and reconciled.

3. Discussed with Dr. Lomax.









  ______________________________________________

  Misbah Francis M.D. DR:  ISRAEL

D:  04/17/2019 02:04

T:  04/17/2019 02:38

JOB#:  [id number]

CC:

## 2019-04-17 NOTE — DISCHARGE SUMMARY
DATE OF ADMISSION:  04/11/2019



DATE OF DISCHARGE:  04/16/2019



PERTINENT HISTORY:  The patient is an 87-year-old lady, who came apparently

with altered mental status, leukocytosis, sodium of 161, and dehydration.

She had recently been treated for pneumonia dysphagia, gastrostomy

placement, and Alzheimer's.



PERTINENT PHYSICAL FINDINGS:  See the note by Dr. Story.  She had mouth

breathing.

HEART:  Regular rhythm.

LUNGS:  Upper airway rhonchi.

ABDOMEN:  Nondistended and nontender.

EXTREMITIES:  Right leg edema.



COURSE IN THE HOSPITAL:  The patient was given empiric antibiotics and

started on anticoagulants.  She has an IVC filter and was started on

anticoagulants although there was some controversy.  Her leukocytosis

abated quickly.  She was seen by Dr. rFancis in Cardiology consultation.

Cultures remained negative.  Chest x-ray negative.  Her electrolytes

corrected and she was discharged to an ECF in stable condition.  I had a

long discussion with the family with the risks and benefit of

anticoagulation in view of the fact that she had an IVC filter and was a

high risk of bleeding.  I stopped anticoagulants at the time of discharge

after discussion with Dr. Misbah Francis.



FINAL DIAGNOSES:

1. Dehydration.

2. Leukocytosis.

3. History of dysphagia.

4. Dehydration.

5. Alzheimer's disease.

6. Chronic DVT right lower extremity.

7. Nonambulatory state.



DISCHARGE DISPOSITION:  To the ECF with tube feedings.



DISCHARGE MEDICATIONS:  Per the discharge medication list.



FOLLOWUP:  Follow up by Dr. Lomax in the facility.









  ______________________________________________

  Presley Lomax M.D.





DR:  NINI

D:  04/16/2019 13:38

T:  04/17/2019 03:35

JOB#:  [id number]

CC:

## 2020-11-02 NOTE — EMERGENCY ROOM REPORT
History of Present Illness


General


Chief Complaint:  Fever


Source:  Family Member, Medical Record





Present Illness


HPI


Patient is an 88-year-old female who presented after increased fever.  Patient 

was sent in from facility.Patient been noted to have increased cough as well as 

elevated sodium.  Patient recent hospitalization at [facility] had 

previously been intubated.  Patient had prior history of tardive dyskinesia and 

had been noted to have increased difficulty with breathing.


Allergies:  


Coded Allergies:  


     No Known Allergies (Unverified , 10/4/16)





Patient History


Past Medical History:  see triage record


Last Menstrual Period:  na


Reviewed Nursing Documentation:  PMH: Agreed; PSxH: Agreed





Nursing Documentation-PMH


Past Medical History:  No History, Except For


Hx Cardiac Problems:  Yes


Hx Hypertension:  Yes


Hx Cancer:  No


Hx Gastrointestinal Problems:  Yes - gtube, GERD


History Of Psychiatric Problem:  Yes - dementia


Hx Neurological Problems:  Yes - BRAIN ANEURYSM


Hx Cerebrovascular Accident:  Yes - aneurysm


Hx Dementia:  Yes





Review of Systems


All Other Systems:  limited - by mental status





Physical Exam





Vital Signs








  Date Time  Temp Pulse Resp B/P (MAP) Pulse Ox O2 Delivery O2 Flow Rate FiO2


 


4/11/19 13:58  70 36 123/70 92 Nasal Cannula 4.0 








General Appearance:  alert, Chronically Ill


Head:  normocephalic


ENT:  uvula midline, dry mucus membranes


Neck:  limited range of motion


Respiratory:  respiratory distress, rhonchi


Cardiovascular #1:  tachycardia, edema - right lower extremity


Gastrointestinal:  normal inspection, non tender, soft


Musculoskeletal:  normal inspection, swelling - right lower extremity


Neurologic:  alert, responsive, aphasia


Psychiatric:  anxious


Skin:  other - right lower extremity erythema and edema, no fluctuance





Medical Decision Making


Diagnostic Impression:  


 Primary Impression:  


 Acute DVT (deep venous thrombosis)


 Additional Impressions:  


 Sepsis


 HCAP (healthcare-associated pneumonia)


 Congestive heart failure (CHF)


 Hypernatremia


ER Course


Patient presented for fever as well as hyponatremia.  Differential diagnosis 

include was not limited to sepsis, pneumonia, heart failure, influenza among 

others.  Because of complexity of patient's case laboratory testing and imaging 

studies were ordered.  Laboratory testing was notable for markedly elevated 

white blood count.  Chest x-ray 1 view read by radiology showed suboptimal 

inspiration and crowding of bronchovascular markings without a definite 

infiltrate.  Duplex ultrasound of the right lower extremity showed right common 

femoral and femoral vein thrombosis with unclear change from previous.  Patient 

was started on anticoagulation.  Dr. Kwan was contacted for inpatient 

management due to covering physician for Dr. Presley Lomax.





Labs








Test


  4/11/19


14:20 4/11/19


14:40 4/11/19


16:59


 


White Blood Count


  22.5 K/UL


(4.8-10.8) 


  


 


 


Red Blood Count


  4.77 M/UL


(4.20-5.40) 


  


 


 


Hemoglobin


  13.4 G/DL


(12.0-16.0) 


  


 


 


Hematocrit


  43.2 %


(37.0-47.0) 


  


 


 


Mean Corpuscular Volume 91 FL (80-99)   


 


Mean Corpuscular Hemoglobin


  28.0 PG


(27.0-31.0) 


  


 


 


Mean Corpuscular Hemoglobin


Concent 30.9 G/DL


(32.0-36.0) 


  


 


 


Red Cell Distribution Width


  18.2 %


(11.6-14.8) 


  


 


 


Platelet Count


  118 K/UL


(150-450) 


  


 


 


Mean Platelet Volume


  10.9 FL


(6.5-10.1) 


  


 


 


Neutrophils (%) (Auto)  % (45.0-75.0)   


 


Lymphocytes (%) (Auto)  % (20.0-45.0)   


 


Monocytes (%) (Auto)  % (1.0-10.0)   


 


Eosinophils (%) (Auto)  % (0.0-3.0)   


 


Basophils (%) (Auto)  % (0.0-2.0)   


 


Differential Total Cells


Counted 100 


  


  


 


 


Neutrophils % (Manual) 87 % (45-75)   


 


Lymphocytes % (Manual) 6 % (20-45)   


 


Monocytes % (Manual) 2 % (1-10)   


 


Eosinophils % (Manual) 0 % (0-3)   


 


Basophils % (Manual) 0 % (0-2)   


 


Band Neutrophils 5 % (0-8)   


 


Platelet Estimate Decreased   


 


Platelet Morphology Normal   


 


Polychromasia 1+   


 


Anisocytosis 1+   


 


Activated Partial


Thromboplast Time 26 SEC (23-33) 


  


  


 


 


Sodium Level


  161 MMOL/L


(136-145) 


  


 


 


Potassium Level


  3.3 MMOL/L


(3.5-5.1) 


  


 


 


Chloride Level


  120 MMOL/L


() 


  


 


 


Carbon Dioxide Level


  28 MMOL/L


(21-32) 


  


 


 


Anion Gap


  12 mmol/L


(5-15) 


  


 


 


Blood Urea Nitrogen


  45 mg/dL


(7-18) 


  


 


 


Creatinine


  1.1 MG/DL


(0.55-1.30) 


  


 


 


Estimat Glomerular Filtration


Rate  mL/min (>60) 


  


  


 


 


Glucose Level


  210 MG/DL


() 


  


 


 


Lactic Acid Level


  1.80 mmol/L


(0.4-2.0) 


  


 


 


Calcium Level


  9.2 MG/DL


(8.5-10.1) 


  


 


 


Phosphorus Level


  2.9 MG/DL


(2.5-4.9) 


  


 


 


Magnesium Level


  2.6 MG/DL


(1.8-2.4) 


  


 


 


Total Bilirubin


  0.7 MG/DL


(0.2-1.0) 


  


 


 


Aspartate Amino Transf


(AST/SGOT) 42 U/L (15-37) 


  


  


 


 


Alanine Aminotransferase


(ALT/SGPT) 51 U/L (12-78) 


  


  


 


 


Alkaline Phosphatase


  74 U/L


() 


  


 


 


Total Creatine Kinase


  44 U/L


() 


  


 


 


Creatine Kinase MB


  0.6 NG/ML


(0.0-3.6) 


  


 


 


Creatine Kinase MB Relative


Index 1.3 


  


  


 


 


Troponin I


  0.076 ng/mL


(0.000-0.056) 


  


 


 


Total Protein


  8.2 G/DL


(6.4-8.2) 


  


 


 


Albumin


  2.9 G/DL


(3.4-5.0) 


  


 


 


Globulin 5.3 g/dL   


 


Albumin/Globulin Ratio 0.5 (1.0-2.7)   


 


Lipase


  121 U/L


() 


  


 


 


Urine Color  Yellow  


 


Urine Appearance  Clear  


 


Urine pH  5 (4.5-8.0)  


 


Urine Specific Gravity


  


  1.025


(1.005-1.035) 


 


 


Urine Protein  2+ (NEGATIVE)  


 


Urine Glucose (UA)


  


  Negative


(NEGATIVE) 


 


 


Urine Ketones  1+ (NEGATIVE)  


 


Urine Blood


  


  Negative


(NEGATIVE) 


 


 


Urine Nitrite


  


  Negative


(NEGATIVE) 


 


 


Urine Bilirubin


  


  Negative


(NEGATIVE) 


 


 


Urine Urobilinogen


  


  4 MG/DL


(0.0-1.0) 


 


 


Urine Leukocyte Esterase  1+ (NEGATIVE)  


 


Urine RBC


  


  0-2 /HPF (0 -


2) 


 


 


Urine WBC


  


  2-4 /HPF (0 -


2) 


 


 


Urine Squamous Epithelial


Cells 


  Few /LPF


(NONE/OCC) 


 


 


Urine Amorphous Sediment


  


  Few /LPF


(NONE) 


 


 


Urine Bacteria


  


  Moderate /HPF


(NONE) 


 


 


Arterial Blood pH


  


  


  7.451


(7.350-7.450)


 


Arterial Blood Partial


Pressure CO2 


  


  37.1 mmHg


(35.0-45.0)


 


Arterial Blood Partial


Pressure O2 


  


  74.4 mmHg


(75.0-100.0)


 


Arterial Blood HCO3


  


  


  25.3 mmol/L


(22.0-26.0)


 


Arterial Blood Oxygen


Saturation 


  


  94.6 %


()


 


Arterial Blood Base Excess   1.5 (-2-2) 


 


Joaquin Test   N/a 








EKG Diagnostic Results


Rate:  tachycardiac - sinus tachycardia occasional pacs


Rhythm:  NSR - 110





Last Vital Signs








  Date Time  Temp Pulse Resp B/P (MAP) Pulse Ox O2 Delivery O2 Flow Rate FiO2


 


4/11/19 13:58  70 36 123/70 92 Nasal Cannula 4.0 








Status:  improved


Disposition:  ADMITTED AS INPATIENT


Condition:  Stable











Kendell Macias MD Apr 11, 2019 14:13 no